# Patient Record
Sex: MALE | Race: BLACK OR AFRICAN AMERICAN | NOT HISPANIC OR LATINO | Employment: FULL TIME | URBAN - METROPOLITAN AREA
[De-identification: names, ages, dates, MRNs, and addresses within clinical notes are randomized per-mention and may not be internally consistent; named-entity substitution may affect disease eponyms.]

---

## 2018-11-29 RX ORDER — LISINOPRIL 20 MG/1
TABLET ORAL
COMMUNITY
Start: 2018-04-10 | End: 2018-11-30 | Stop reason: SDUPTHER

## 2018-11-29 RX ORDER — ASPIRIN 81 MG/1
TABLET ORAL EVERY 24 HOURS
COMMUNITY
Start: 2016-01-20 | End: 2018-12-28

## 2018-11-30 ENCOUNTER — OFFICE VISIT (OUTPATIENT)
Dept: FAMILY MEDICINE CLINIC | Facility: CLINIC | Age: 53
End: 2018-11-30
Payer: COMMERCIAL

## 2018-11-30 VITALS
SYSTOLIC BLOOD PRESSURE: 152 MMHG | RESPIRATION RATE: 18 BRPM | HEART RATE: 64 BPM | DIASTOLIC BLOOD PRESSURE: 82 MMHG | TEMPERATURE: 98.3 F | OXYGEN SATURATION: 97 % | WEIGHT: 261.1 LBS | HEIGHT: 67 IN | BODY MASS INDEX: 40.98 KG/M2

## 2018-11-30 DIAGNOSIS — I10 ESSENTIAL HYPERTENSION: Primary | ICD-10-CM

## 2018-11-30 DIAGNOSIS — J18.9 PNEUMONIA OF LEFT LOWER LOBE DUE TO INFECTIOUS ORGANISM: ICD-10-CM

## 2018-11-30 DIAGNOSIS — R73.9 HYPERGLYCEMIA: ICD-10-CM

## 2018-11-30 DIAGNOSIS — H55.01 CONGENITAL NYSTAGMUS: ICD-10-CM

## 2018-11-30 PROCEDURE — 1036F TOBACCO NON-USER: CPT | Performed by: FAMILY MEDICINE

## 2018-11-30 PROCEDURE — 3008F BODY MASS INDEX DOCD: CPT | Performed by: FAMILY MEDICINE

## 2018-11-30 PROCEDURE — 99214 OFFICE O/P EST MOD 30 MIN: CPT | Performed by: FAMILY MEDICINE

## 2018-11-30 RX ORDER — LISINOPRIL 20 MG/1
20 TABLET ORAL DAILY
Qty: 90 TABLET | Refills: 3 | Status: SHIPPED | OUTPATIENT
Start: 2018-11-30 | End: 2020-03-08

## 2018-11-30 NOTE — LETTER
Eugenio Herring  1965      To whom it may concern:       Patient has DIANA and uses CPAP    Renew supplies as required                              FRIEDA Haider

## 2018-11-30 NOTE — PATIENT INSTRUCTIONS
Use your inhaler every 4 hours while awake for at least 4-5 days regularly  Finish the antibiotic  Okay to return to work Monday if you feel up to it  Return in 1 month to review this issue and your chronic medical problems  I will hopefully have the reports from [facility] so we can review  Likely will need a repeat x-ray to document complete clearing of the pneumonia

## 2018-11-30 NOTE — PROGRESS NOTES
Assessment/Plan:    No problem-specific Assessment & Plan notes found for this encounter  Diagnoses and all orders for this visit:    Essential hypertension  -     lisinopril (ZESTRIL) 20 mg tablet; Take 1 tablet (20 mg total) by mouth daily    Congenital nystagmus    Hyperglycemia    Pneumonia of left lower lobe due to infectious organism [facility])          Subjective:      Patient ID: Rosa Smith is a 52 y o  male  Trials comes today to follow-up of but recent bout of pneumonia  He was seen in the emergency room yesterday and was given Levaquin by intravenous  He is to start pills today for 1 week  He does notice a little bit of worsening asthma during the last several days as well and did have some fever yesterday  No tobacco          The following portions of the patient's history were reviewed and updated as appropriate: allergies, current medications, past family history, past medical history, past social history, past surgical history and problem list     Review of Systems   Constitutional: Negative for activity change and appetite change  HENT: Negative for trouble swallowing  Eyes: Negative for visual disturbance  Respiratory: Positive for cough and wheezing  Negative for shortness of breath  Cardiovascular: Negative for chest pain, palpitations and leg swelling  Gastrointestinal: Negative for abdominal pain and blood in stool  Endocrine: Negative for polyuria  Genitourinary: Negative for difficulty urinating and hematuria  Skin: Negative for rash  Neurological: Negative for dizziness  Psychiatric/Behavioral: Negative for behavioral problems  Objective:  Vitals:    11/30/18 1319   BP: 152/82   BP Location: Left arm   Patient Position: Sitting   Cuff Size: Large   Pulse: 64   Resp: 18   Temp: 98 3 °F (36 8 °C)   SpO2: 97%   Weight: 118 kg (261 lb 1 6 oz)   Height: 5' 7" (1 702 m)      Physical Exam   Constitutional: He appears well-developed and well-nourished  HENT:   Head: Normocephalic and atraumatic  Eyes: Conjunctivae are normal    Neck: Neck supple  No thyromegaly present  Cardiovascular: Normal rate, regular rhythm, normal heart sounds and intact distal pulses  No murmur heard  Pulmonary/Chest: Effort normal  He has wheezes  He has rales  LLL pneumonia findings    Musculoskeletal: He exhibits no edema  Lymphadenopathy:     He has no cervical adenopathy  Skin: Skin is warm and dry  Psychiatric: He has a normal mood and affect   His behavior is normal

## 2018-12-05 ENCOUNTER — TELEPHONE (OUTPATIENT)
Dept: FAMILY MEDICINE CLINIC | Facility: CLINIC | Age: 53
End: 2018-12-05

## 2018-12-05 NOTE — TELEPHONE ENCOUNTER
Pt stopped by the office stating he was seen by Dr Jennie Richardson for pneumonia and was given a work note  However his work place would like a form filled out  Pt would like if it could be faxed to the number on the form and would like to  the original copy when finished  Form will be put in Dr Jennie Richardson bin

## 2018-12-06 ENCOUNTER — TELEPHONE (OUTPATIENT)
Dept: FAMILY MEDICINE CLINIC | Facility: CLINIC | Age: 53
End: 2018-12-06

## 2018-12-06 NOTE — TELEPHONE ENCOUNTER
Called patient to inform him that his appointment on 12/27/18 at 3 pm with dr Madalyn Dodge has to be changed he will not be here in the afternoon this day so appointment was cancelled and rescheduled to 12/28/18 at 330 pm with dr Madalyn Dodge patient wanted late appointment

## 2018-12-28 ENCOUNTER — TELEPHONE (OUTPATIENT)
Dept: FAMILY MEDICINE CLINIC | Facility: CLINIC | Age: 53
End: 2018-12-28

## 2018-12-28 ENCOUNTER — OFFICE VISIT (OUTPATIENT)
Dept: FAMILY MEDICINE CLINIC | Facility: CLINIC | Age: 53
End: 2018-12-28
Payer: COMMERCIAL

## 2018-12-28 VITALS
SYSTOLIC BLOOD PRESSURE: 140 MMHG | OXYGEN SATURATION: 96 % | HEIGHT: 67 IN | WEIGHT: 270.5 LBS | BODY MASS INDEX: 42.46 KG/M2 | TEMPERATURE: 97.5 F | DIASTOLIC BLOOD PRESSURE: 80 MMHG | HEART RATE: 57 BPM

## 2018-12-28 DIAGNOSIS — I10 ESSENTIAL HYPERTENSION: ICD-10-CM

## 2018-12-28 DIAGNOSIS — J18.9 PNEUMONIA OF LEFT LOWER LOBE DUE TO INFECTIOUS ORGANISM: ICD-10-CM

## 2018-12-28 DIAGNOSIS — R73.9 HYPERGLYCEMIA: ICD-10-CM

## 2018-12-28 DIAGNOSIS — J45.909 UNCOMPLICATED ASTHMA, UNSPECIFIED ASTHMA SEVERITY, UNSPECIFIED WHETHER PERSISTENT: Primary | ICD-10-CM

## 2018-12-28 LAB
ANION GAP SERPL CALCULATED.3IONS-SCNC: 5 MMOL/L (ref 4–13)
BUN SERPL-MCNC: 13 MG/DL (ref 5–25)
CALCIUM SERPL-MCNC: 8.7 MG/DL (ref 8.3–10.1)
CHLORIDE SERPL-SCNC: 105 MMOL/L (ref 100–108)
CO2 SERPL-SCNC: 27 MMOL/L (ref 21–32)
CREAT SERPL-MCNC: 0.97 MG/DL (ref 0.6–1.3)
EST. AVERAGE GLUCOSE BLD GHB EST-MCNC: 146 MG/DL
GFR SERPL CREATININE-BSD FRML MDRD: 101 ML/MIN/1.73SQ M
GLUCOSE SERPL-MCNC: 97 MG/DL (ref 65–140)
HBA1C MFR BLD: 6.7 % (ref 4.2–6.3)
POTASSIUM SERPL-SCNC: 3.9 MMOL/L (ref 3.5–5.3)
SODIUM SERPL-SCNC: 137 MMOL/L (ref 136–145)

## 2018-12-28 PROCEDURE — 83036 HEMOGLOBIN GLYCOSYLATED A1C: CPT | Performed by: FAMILY MEDICINE

## 2018-12-28 PROCEDURE — 99214 OFFICE O/P EST MOD 30 MIN: CPT | Performed by: FAMILY MEDICINE

## 2018-12-28 PROCEDURE — 36415 COLL VENOUS BLD VENIPUNCTURE: CPT | Performed by: FAMILY MEDICINE

## 2018-12-28 PROCEDURE — 80048 BASIC METABOLIC PNL TOTAL CA: CPT | Performed by: FAMILY MEDICINE

## 2018-12-28 NOTE — PATIENT INSTRUCTIONS
Please consider doing a colonoscopy next year  When you are ready call me and I can schedule it we do not need to get together to do that

## 2018-12-28 NOTE — TELEPHONE ENCOUNTER
Per Pat Post   Called pt employer to confirm the correct return of date to work which is 12/03/18  I spoke to [employer] phone number [phone]  Form was also faxed to [phone] confirmation received

## 2018-12-28 NOTE — PROGRESS NOTES
Assessment/Plan:    Pneumonia of left lower lobe due to infectious organism ([address] )  Cleared ; lungs normal today        Diagnoses and all orders for this visit:    Uncomplicated asthma, unspecified asthma severity, unspecified whether persistent  -     fluticasone-salmeterol (ADVAIR HFA) 230-21 MCG/ACT inhaler; Inhale 2 puffs 2 (two) times a day Rinse mouth after use  Pneumonia of left lower lobe due to infectious organism [facility])    Essential hypertension    Hyperglycemia          Subjective:      Patient ID: Shital Doran is a 52 y o  male  PATIENT RETURNS FOR FOLLOW-UP OF CHRONIC MEDICAL CONDITIONS  NO HOSPITAL STAYS OR EMERGENCY VISITS RECENTLY  MEDS WERE REVIEWED AND NO SIDE EFFECTS  NO NEW ISSUES  UNLESS NOTED BELOW  NO NEW MEDICAL PROVIDER REPORTED  The following portions of the patient's history were reviewed and updated as appropriate: allergies, current medications, past family history, past medical history, past social history, past surgical history and problem list     Review of Systems   Constitutional: Negative for activity change and appetite change  HENT: Negative for trouble swallowing  Eyes: Negative for visual disturbance  Respiratory: Negative for cough and shortness of breath  Cardiovascular: Negative for chest pain, palpitations and leg swelling  Gastrointestinal: Negative for abdominal pain and blood in stool  Endocrine: Negative for polyuria  Genitourinary: Negative for difficulty urinating and hematuria  Skin: Negative for rash  Neurological: Negative for dizziness  Psychiatric/Behavioral: Negative for behavioral problems           Objective:  Vitals:    12/28/18 1526   BP: 140/80   BP Location: Left arm   Patient Position: Sitting   Cuff Size: Large   Pulse: 57   Temp: 97 5 °F (36 4 °C)   TempSrc: Temporal   SpO2: 96%   Weight: 123 kg (270 lb 8 oz)   Height: 5' 7" (1 702 m)      Physical Exam   Constitutional: He appears well-developed and well-nourished  HENT:   Head: Normocephalic and atraumatic  Eyes: Conjunctivae are normal    Neck: Neck supple  No thyromegaly present  Cardiovascular: Normal rate, regular rhythm, normal heart sounds and intact distal pulses  No murmur heard  Pulmonary/Chest: Effort normal and breath sounds normal  No respiratory distress  Musculoskeletal: He exhibits no edema  Lymphadenopathy:     He has no cervical adenopathy  Skin: Skin is warm and dry  Psychiatric: He has a normal mood and affect  His behavior is normal          Patient's chronic problems that were reviewed today are stable  Meds reviewed and no changes made  Appropriate labs and imaging were ordered  Preventive measures appropriate for age and sex were reviewed with patient  Immunizations were updated as appropriate  Refusing immunizations

## 2019-03-29 ENCOUNTER — TELEPHONE (OUTPATIENT)
Dept: FAMILY MEDICINE CLINIC | Facility: CLINIC | Age: 54
End: 2019-03-29

## 2019-04-01 DIAGNOSIS — M25.529 ELBOW PAIN, UNSPECIFIED LATERALITY: Primary | ICD-10-CM

## 2019-04-01 DIAGNOSIS — M25.519 SHOULDER PAIN, UNSPECIFIED CHRONICITY, UNSPECIFIED LATERALITY: ICD-10-CM

## 2019-06-21 ENCOUNTER — OFFICE VISIT (OUTPATIENT)
Dept: FAMILY MEDICINE CLINIC | Facility: CLINIC | Age: 54
End: 2019-06-21
Payer: COMMERCIAL

## 2019-06-21 VITALS
WEIGHT: 261 LBS | SYSTOLIC BLOOD PRESSURE: 138 MMHG | DIASTOLIC BLOOD PRESSURE: 86 MMHG | RESPIRATION RATE: 18 BRPM | BODY MASS INDEX: 39.56 KG/M2 | HEART RATE: 80 BPM | HEIGHT: 68 IN

## 2019-06-21 DIAGNOSIS — N52.8 OTHER MALE ERECTILE DYSFUNCTION: Chronic | ICD-10-CM

## 2019-06-21 DIAGNOSIS — R73.9 HYPERGLYCEMIA: Primary | ICD-10-CM

## 2019-06-21 DIAGNOSIS — G47.30 SLEEP APNEA, UNSPECIFIED TYPE: ICD-10-CM

## 2019-06-21 DIAGNOSIS — I10 ESSENTIAL HYPERTENSION: ICD-10-CM

## 2019-06-21 LAB
CREAT UR-MCNC: 198 MG/DL
MICROALBUMIN UR-MCNC: 14.6 MG/L (ref 0–20)
MICROALBUMIN/CREAT 24H UR: 7 MG/G CREATININE (ref 0–30)
SL AMB POCT HGB: 5.9

## 2019-06-21 PROCEDURE — 99214 OFFICE O/P EST MOD 30 MIN: CPT | Performed by: FAMILY MEDICINE

## 2019-06-21 PROCEDURE — 3075F SYST BP GE 130 - 139MM HG: CPT | Performed by: FAMILY MEDICINE

## 2019-06-21 PROCEDURE — 3008F BODY MASS INDEX DOCD: CPT | Performed by: FAMILY MEDICINE

## 2019-06-21 PROCEDURE — 82570 ASSAY OF URINE CREATININE: CPT | Performed by: FAMILY MEDICINE

## 2019-06-21 PROCEDURE — 3079F DIAST BP 80-89 MM HG: CPT | Performed by: FAMILY MEDICINE

## 2019-06-21 PROCEDURE — 85018 HEMOGLOBIN: CPT | Performed by: FAMILY MEDICINE

## 2019-06-21 PROCEDURE — 1036F TOBACCO NON-USER: CPT | Performed by: FAMILY MEDICINE

## 2019-06-21 PROCEDURE — 36416 COLLJ CAPILLARY BLOOD SPEC: CPT | Performed by: FAMILY MEDICINE

## 2019-06-21 PROCEDURE — 82043 UR ALBUMIN QUANTITATIVE: CPT | Performed by: FAMILY MEDICINE

## 2019-06-21 RX ORDER — TADALAFIL 20 MG/1
20 TABLET ORAL DAILY PRN
Qty: 10 TABLET | Refills: 12 | Status: SHIPPED | OUTPATIENT
Start: 2019-06-21 | End: 2020-04-24 | Stop reason: ALTCHOICE

## 2019-07-23 ENCOUNTER — TELEPHONE (OUTPATIENT)
Dept: FAMILY MEDICINE CLINIC | Facility: CLINIC | Age: 54
End: 2019-07-23

## 2019-07-23 NOTE — TELEPHONE ENCOUNTER
Left message for patient to call back  Patient has an upcoming appt with glp  glp is away on vacation  Patient needs to reschedule

## 2019-08-22 ENCOUNTER — OFFICE VISIT (OUTPATIENT)
Dept: FAMILY MEDICINE CLINIC | Facility: CLINIC | Age: 54
End: 2019-08-22
Payer: COMMERCIAL

## 2019-08-22 VITALS
SYSTOLIC BLOOD PRESSURE: 140 MMHG | HEIGHT: 68 IN | TEMPERATURE: 97.8 F | OXYGEN SATURATION: 96 % | WEIGHT: 260.6 LBS | HEART RATE: 60 BPM | DIASTOLIC BLOOD PRESSURE: 70 MMHG | BODY MASS INDEX: 39.5 KG/M2

## 2019-08-22 DIAGNOSIS — L91.8 SKIN TAGS, MULTIPLE ACQUIRED: ICD-10-CM

## 2019-08-22 DIAGNOSIS — Z12.11 ENCOUNTER FOR SCREENING COLONOSCOPY: Primary | ICD-10-CM

## 2019-08-22 PROCEDURE — 17110 DESTRUCTION B9 LES UP TO 14: CPT | Performed by: FAMILY MEDICINE

## 2019-08-22 NOTE — PROGRESS NOTES
Assessment/Plan:    No problem-specific Assessment & Plan notes found for this encounter  Diagnoses and all orders for this visit:    Encounter for screening colonoscopy  -     Ambulatory referral to Gastroenterology; Future    Skin tags, multiple acquired          Subjective:      Patient ID: Nina Watkins is a 52 y o  male      Skin tags       The following portions of the patient's history were reviewed and updated as appropriate: allergies, current medications, past family history, past medical history, past social history, past surgical history and problem list     Review of Systems      Objective:  Vitals:    08/22/19 1458   BP: 140/70   BP Location: Left arm   Patient Position: Sitting   Cuff Size: Large   Pulse: 60   Temp: 97 8 °F (36 6 °C)   TempSrc: Temporal   SpO2: 96%   Weight: 118 kg (260 lb 9 6 oz)   Height: 5' 8" (1 727 m)      Physical Exam   Skin:   multipple tags neck      frozen

## 2019-09-18 ENCOUNTER — OFFICE VISIT (OUTPATIENT)
Dept: FAMILY MEDICINE CLINIC | Facility: CLINIC | Age: 54
End: 2019-09-18
Payer: COMMERCIAL

## 2019-09-18 VITALS
WEIGHT: 258.6 LBS | SYSTOLIC BLOOD PRESSURE: 152 MMHG | BODY MASS INDEX: 39.19 KG/M2 | TEMPERATURE: 98 F | RESPIRATION RATE: 18 BRPM | DIASTOLIC BLOOD PRESSURE: 86 MMHG | OXYGEN SATURATION: 97 % | HEIGHT: 68 IN | HEART RATE: 56 BPM

## 2019-09-18 DIAGNOSIS — Z01.818 PREPROCEDURAL GENERAL PHYSICAL EXAMINATION: Primary | ICD-10-CM

## 2019-09-18 DIAGNOSIS — H33.22 DETACHED RETINA, LEFT: ICD-10-CM

## 2019-09-18 DIAGNOSIS — Z28.21 REFUSED INFLUENZA VACCINE: ICD-10-CM

## 2019-09-18 LAB
BASOPHILS # BLD AUTO: 0.03 THOUSANDS/ΜL (ref 0–0.1)
BASOPHILS NFR BLD AUTO: 1 % (ref 0–1)
EOSINOPHIL # BLD AUTO: 0.11 THOUSAND/ΜL (ref 0–0.61)
EOSINOPHIL NFR BLD AUTO: 3 % (ref 0–6)
ERYTHROCYTE [DISTWIDTH] IN BLOOD BY AUTOMATED COUNT: 13.6 % (ref 11.6–15.1)
HCT VFR BLD AUTO: 44.7 % (ref 36.5–49.3)
HGB BLD-MCNC: 14.6 G/DL (ref 12–17)
IMM GRANULOCYTES # BLD AUTO: 0.01 THOUSAND/UL (ref 0–0.2)
IMM GRANULOCYTES NFR BLD AUTO: 0 % (ref 0–2)
LYMPHOCYTES # BLD AUTO: 1.48 THOUSANDS/ΜL (ref 0.6–4.47)
LYMPHOCYTES NFR BLD AUTO: 39 % (ref 14–44)
MCH RBC QN AUTO: 29.3 PG (ref 26.8–34.3)
MCHC RBC AUTO-ENTMCNC: 32.7 G/DL (ref 31.4–37.4)
MCV RBC AUTO: 90 FL (ref 82–98)
MONOCYTES # BLD AUTO: 0.48 THOUSAND/ΜL (ref 0.17–1.22)
MONOCYTES NFR BLD AUTO: 13 % (ref 4–12)
NEUTROPHILS # BLD AUTO: 1.66 THOUSANDS/ΜL (ref 1.85–7.62)
NEUTS SEG NFR BLD AUTO: 44 % (ref 43–75)
NRBC BLD AUTO-RTO: 0 /100 WBCS
PLATELET # BLD AUTO: 246 THOUSANDS/UL (ref 149–390)
PMV BLD AUTO: 9.6 FL (ref 8.9–12.7)
RBC # BLD AUTO: 4.99 MILLION/UL (ref 3.88–5.62)
WBC # BLD AUTO: 3.77 THOUSAND/UL (ref 4.31–10.16)

## 2019-09-18 PROCEDURE — 85025 COMPLETE CBC W/AUTO DIFF WBC: CPT | Performed by: NURSE PRACTITIONER

## 2019-09-18 PROCEDURE — 80053 COMPREHEN METABOLIC PANEL: CPT | Performed by: NURSE PRACTITIONER

## 2019-09-18 PROCEDURE — 99214 OFFICE O/P EST MOD 30 MIN: CPT | Performed by: NURSE PRACTITIONER

## 2019-09-18 PROCEDURE — 36415 COLL VENOUS BLD VENIPUNCTURE: CPT | Performed by: NURSE PRACTITIONER

## 2019-09-18 PROCEDURE — 93000 ELECTROCARDIOGRAM COMPLETE: CPT | Performed by: NURSE PRACTITIONER

## 2019-09-18 PROCEDURE — 3008F BODY MASS INDEX DOCD: CPT | Performed by: NURSE PRACTITIONER

## 2019-09-18 NOTE — PROGRESS NOTES
[facility] PRE-OPERATIVE EVALUATION  [facility] - [facility]    NAME: Donaldo Chowdhury  AGE: 52 y o  SEX: male  : 1965     DATE: 2019    Family Practice Pre-Operative Evaluation      Chief Complaint: Pre-operative Evaluation     Surgery:  Left eye vitrectomy  Anticipated Date of Surgery:  2019  Referring Provider: No ref  provider found       History of Present Illness:     Donaldo Chowdhury is a 52 y o  male who presents to the office today for a preoperative consultation at the request of Amanda Wang MD, who plans on performing left eye vitrectomy on 2019  Planned anesthesia is general  Patient has a bleeding risk of: no recent abnormal bleeding, no remote history of abnormal bleeding and no use of Ca-channel blockers  Patient does not have objections to receiving blood products if needed  Current anti-platelet/anti-coagulation medications that the patient is prescribed includes: none  Assessment of Chronic Conditions:   - Hypertension: essential  - Hyperglycemia     Assessment of Cardiac Risk:  · Denies unstable or severe angina or MI in the last 6 weeks or history of stent placement in the last year   · Denies decompensated heart failure (e g  New onset heart failure, NYHA functional class IV heart failure, or worsening existing heart failure)  · Denies significant arrhythmias such as high grade AV block, symptomatic ventricular arrhythmia, newly recognized ventricular tachycardia, supraventricular tachycardia with resting heart rate >100, or symptomatic bradycardia  · Denies severe heart valve disease including aortic stenosis or symptomatic mitral stenosis     Exercise Capacity:  · Able to walk 4 blocks without symptoms?: Yes  · Able to walk 2 flights without symptoms?: Yes    Prior Anesthesia Reactions: Unknown     Personal history of venous thromboembolic disease?  No    History of steroid use for >2 weeks within last year? No         Review of Systems:     Review of Systems   Constitutional: Negative for chills, fatigue and fever  HENT: Negative for congestion, ear pain, postnasal drip, rhinorrhea, sinus pressure and sore throat  Eyes: Positive for pain and visual disturbance  Respiratory: Negative for cough, shortness of breath and wheezing  Cardiovascular: Negative for chest pain, palpitations and leg swelling  Gastrointestinal: Negative for abdominal pain, blood in stool, constipation, diarrhea, nausea and vomiting  Endocrine: Negative for cold intolerance, heat intolerance, polydipsia, polyphagia and polyuria  Genitourinary: Negative for difficulty urinating, discharge, dysuria, flank pain, frequency, scrotal swelling, testicular pain and urgency  Musculoskeletal: Negative for arthralgias, back pain and gait problem  Skin: Negative for rash  Allergic/Immunologic: Negative for environmental allergies and food allergies  Neurological: Negative for dizziness and headaches  Hematological: Does not bruise/bleed easily  Psychiatric/Behavioral: Negative for dysphoric mood  The patient is not nervous/anxious  Current Problem List:     Patient Active Problem List   Diagnosis    Cervical spinal stenosis    Congenital nystagmus    Hypertension    Sleep apnea    Hyperglycemia    Pneumonia of left lower lobe due to infectious organism [facility])    Other male erectile dysfunction    Skin tags, multiple acquired       Allergies:      Allergies   Allergen Reactions    No Active Allergies        Current Medications:       Current Outpatient Medications:     lisinopril (ZESTRIL) 20 mg tablet, Take 1 tablet (20 mg total) by mouth daily, Disp: 90 tablet, Rfl: 3    albuterol (PROVENTIL HFA,VENTOLIN HFA) 90 mcg/act inhaler, inhale 2 puff by inhalation route  every 4 - 6 hours as needed, Disp: , Rfl:     fluticasone-salmeterol (ADVAIR HFA) 230-21 MCG/ACT inhaler, Inhale 2 puffs 2 (two) times a day Rinse mouth after use  (Patient not taking: Reported on 9/18/2019), Disp: 3 Inhaler, Rfl: 3    tadalafil (CIALIS) 20 MG tablet, Take 1 tablet (20 mg total) by mouth daily as needed for erectile dysfunction (Patient not taking: Reported on 9/18/2019), Disp: 10 tablet, Rfl: 12    Past Medical History:       Past Medical History:   Diagnosis Date    Asthma     hospitalization    Hypertension 2/1/2006    Onychomycosis of toenail     Pneumonia of left lower lobe due to infectious organism ([facility] [address] ) 11/30/2018        History reviewed  No pertinent surgical history       Family History   Problem Relation Age of Onset    Hypertension Family         Social History     Socioeconomic History    Marital status: /Civil Union     Spouse name: Not on file    Number of children: Not on file    Years of education: Not on file    Highest education level: Not on file   Occupational History    Not on file   Social Needs    Financial resource strain: Not on file    Food insecurity:     Worry: Not on file     Inability: Not on file    Transportation needs:     Medical: Not on file     Non-medical: Not on file   Tobacco Use    Smoking status: Never Smoker    Smokeless tobacco: Never Used   Substance and Sexual Activity    Alcohol use: Yes     Frequency: Monthly or less     Comment: social    Drug use: No    Sexual activity: Not on file   Lifestyle    Physical activity:     Days per week: Not on file     Minutes per session: Not on file    Stress: Not on file   Relationships    Social connections:     Talks on phone: Not on file     Gets together: Not on file     Attends [employer]ism service: Not on file     Active member of club or organization: Not on file     Attends meetings of clubs or organizations: Not on file     Relationship status: Not on file    Intimate partner violence:     Fear of current or ex partner: Not on file     Emotionally abused: Not on file     Physically abused: Not on file     Forced sexual activity: Not on file   Other Topics Concern    Not on file   Social History Narrative    Not on file        Physical Exam:     /86 (BP Location: Left arm, Patient Position: Sitting, Cuff Size: Large)   Pulse 56   Temp 98 °F (36 7 °C) (Temporal)   Resp 18   Ht 5' 8" (1 727 m)   Wt 117 kg (258 lb 9 6 oz)   SpO2 97%   BMI 39 32 kg/m²     Physical Exam   Constitutional: He is oriented to person, place, and time  He appears well-developed and well-nourished  HENT:   Head: Normocephalic  Right Ear: Tympanic membrane, external ear and ear canal normal    Left Ear: Tympanic membrane, external ear and ear canal normal    Nose: Nose normal    Eyes: Pupils are equal, round, and reactive to light  Conjunctivae and EOM are normal    Neck: Normal range of motion  Neck supple  No thyromegaly present  Cardiovascular: Normal rate, regular rhythm and normal heart sounds  Pulmonary/Chest: Effort normal and breath sounds normal    Abdominal: Soft  Bowel sounds are normal  He exhibits no distension and no mass  There is no tenderness  There is no rebound and no guarding  Musculoskeletal: Normal range of motion  Lymphadenopathy:     He has no cervical adenopathy  Neurological: He is alert and oriented to person, place, and time  He has normal reflexes  Skin: Skin is warm and dry  Psychiatric: He has a normal mood and affect  His behavior is normal         Data:     Pre-operative work-up    Laboratory Results: Labs ordered     EKG: ordered    Chest x-ray: N/A      Previous cardiopulmonary studies within the past year:  · Echocardiogram: No  · Cardiac Catheterization: No  · Stress Test: No  · Pulmonary Function Testing: No      Assessment & Recommendations:     1  Preprocedural general physical examination  CBC and differential    Comprehensive metabolic panel    POCT ECG   2  Detached retina, left     3   Refused influenza vaccine         Pre-Op Evaluation Assessment  52 y o  male with planned surgery:  Left eye vitrectomy  Known risk factors for perioperative complications: None  Current medications which may produce withdrawal symptoms if withheld perioperatively: none  Pre-Op Evaluation Plan  1  Further preoperative workup as follows:   - ECG  - Complete blood count  - Basic metabolic profile    2  Medication Management/Recommendations:   - None, continue medication regimen including morning of surgery, with sip of water    3  Prophylaxis for cardiac events with perioperative beta-blockers: not indicated  4  Patient requires further consultation with: None    Clearance  Patient is CLEARED for surgery without any additional cardiac testing       ANNY Mares[place]άλη [place] [address] [place] [id number]  Phone#  [phone]  Fax#  [phone]

## 2019-09-19 LAB
ALBUMIN SERPL BCP-MCNC: 4.6 G/DL (ref 3.5–5)
ALP SERPL-CCNC: 68 U/L (ref 46–116)
ALT SERPL W P-5'-P-CCNC: 34 U/L (ref 12–78)
ANION GAP SERPL CALCULATED.3IONS-SCNC: 6 MMOL/L (ref 4–13)
AST SERPL W P-5'-P-CCNC: 23 U/L (ref 5–45)
BILIRUB SERPL-MCNC: 0.75 MG/DL (ref 0.2–1)
BUN SERPL-MCNC: 12 MG/DL (ref 5–25)
CALCIUM SERPL-MCNC: 9 MG/DL (ref 8.3–10.1)
CHLORIDE SERPL-SCNC: 109 MMOL/L (ref 100–108)
CO2 SERPL-SCNC: 26 MMOL/L (ref 21–32)
CREAT SERPL-MCNC: 1.04 MG/DL (ref 0.6–1.3)
GFR SERPL CREATININE-BSD FRML MDRD: 93 ML/MIN/1.73SQ M
GLUCOSE SERPL-MCNC: 95 MG/DL (ref 65–140)
POTASSIUM SERPL-SCNC: 4 MMOL/L (ref 3.5–5.3)
PROT SERPL-MCNC: 7.6 G/DL (ref 6.4–8.2)
SODIUM SERPL-SCNC: 141 MMOL/L (ref 136–145)

## 2020-01-23 ENCOUNTER — OFFICE VISIT (OUTPATIENT)
Dept: FAMILY MEDICINE CLINIC | Facility: CLINIC | Age: 55
End: 2020-01-23
Payer: COMMERCIAL

## 2020-01-23 VITALS
HEIGHT: 68 IN | SYSTOLIC BLOOD PRESSURE: 160 MMHG | BODY MASS INDEX: 41.37 KG/M2 | OXYGEN SATURATION: 96 % | WEIGHT: 273 LBS | HEART RATE: 52 BPM | DIASTOLIC BLOOD PRESSURE: 90 MMHG

## 2020-01-23 DIAGNOSIS — R73.9 HYPERGLYCEMIA: ICD-10-CM

## 2020-01-23 DIAGNOSIS — H55.01 CONGENITAL NYSTAGMUS: ICD-10-CM

## 2020-01-23 DIAGNOSIS — G47.30 SLEEP APNEA, UNSPECIFIED TYPE: ICD-10-CM

## 2020-01-23 DIAGNOSIS — Z12.11 ENCOUNTER FOR SCREENING COLONOSCOPY: Primary | ICD-10-CM

## 2020-01-23 DIAGNOSIS — Z11.59 SCREENING FOR VIRAL DISEASE: ICD-10-CM

## 2020-01-23 DIAGNOSIS — I10 ESSENTIAL HYPERTENSION: ICD-10-CM

## 2020-01-23 DIAGNOSIS — Z23 IMMUNIZATION DUE: ICD-10-CM

## 2020-01-23 DIAGNOSIS — E66.01 MORBID OBESITY WITH BMI OF 40.0-44.9, ADULT (HCC): ICD-10-CM

## 2020-01-23 LAB
ANION GAP SERPL CALCULATED.3IONS-SCNC: 4 MMOL/L (ref 4–13)
BUN SERPL-MCNC: 10 MG/DL (ref 5–25)
CALCIUM SERPL-MCNC: 9.4 MG/DL (ref 8.3–10.1)
CHLORIDE SERPL-SCNC: 109 MMOL/L (ref 100–108)
CO2 SERPL-SCNC: 26 MMOL/L (ref 21–32)
CREAT SERPL-MCNC: 1.12 MG/DL (ref 0.6–1.3)
GFR SERPL CREATININE-BSD FRML MDRD: 84 ML/MIN/1.73SQ M
GLUCOSE SERPL-MCNC: 101 MG/DL (ref 65–140)
POTASSIUM SERPL-SCNC: 3.9 MMOL/L (ref 3.5–5.3)
SODIUM SERPL-SCNC: 139 MMOL/L (ref 136–145)

## 2020-01-23 PROCEDURE — 80048 BASIC METABOLIC PNL TOTAL CA: CPT | Performed by: FAMILY MEDICINE

## 2020-01-23 PROCEDURE — 87389 HIV-1 AG W/HIV-1&-2 AB AG IA: CPT | Performed by: FAMILY MEDICINE

## 2020-01-23 PROCEDURE — 36415 COLL VENOUS BLD VENIPUNCTURE: CPT | Performed by: FAMILY MEDICINE

## 2020-01-23 PROCEDURE — 1036F TOBACCO NON-USER: CPT | Performed by: FAMILY MEDICINE

## 2020-01-23 PROCEDURE — 99214 OFFICE O/P EST MOD 30 MIN: CPT | Performed by: FAMILY MEDICINE

## 2020-01-23 PROCEDURE — 3008F BODY MASS INDEX DOCD: CPT | Performed by: FAMILY MEDICINE

## 2020-01-23 PROCEDURE — 83036 HEMOGLOBIN GLYCOSYLATED A1C: CPT | Performed by: FAMILY MEDICINE

## 2020-01-23 PROCEDURE — 86803 HEPATITIS C AB TEST: CPT | Performed by: FAMILY MEDICINE

## 2020-01-23 RX ORDER — BROMFENAC 1.03 MG/ML
SOLUTION/ DROPS OPHTHALMIC
COMMUNITY
Start: 2020-01-14 | End: 2020-04-24 | Stop reason: ALTCHOICE

## 2020-01-23 RX ORDER — PREDNISOLONE ACETATE 10 MG/ML
SUSPENSION/ DROPS OPHTHALMIC
COMMUNITY
Start: 2020-01-14 | End: 2020-04-24 | Stop reason: ALTCHOICE

## 2020-01-23 RX ORDER — OFLOXACIN 3 MG/ML
SOLUTION/ DROPS OPHTHALMIC
COMMUNITY
Start: 2020-01-14 | End: 2020-04-24 | Stop reason: ALTCHOICE

## 2020-01-23 NOTE — PATIENT INSTRUCTIONS
Weight Management   AMBULATORY CARE:   Why it is important to manage your weight:  Being overweight increases your risk of health conditions such as heart disease, high blood pressure, type 2 diabetes, and certain types of cancer  It can also increase your risk for osteoarthritis, sleep apnea, and other respiratory problems  Aim for a slow, steady weight loss  Even a small amount of weight loss can lower your risk of health problems  How to lose weight safely:  A safe and healthy way to lose weight is to eat fewer calories and get regular exercise  You can lose up about 1 pound a week by decreasing the number of calories you eat by 500 calories each day  You can decrease calories by eating smaller portion sizes or by cutting out high-calorie foods  Read labels to find out how many calories are in the foods you eat  You can also burn calories with exercise such as walking, swimming, or biking  You will be more likely to keep weight off if you make these changes part of your lifestyle  Healthy meal plan for weight management:  A healthy meal plan includes a variety of foods, contains fewer calories, and helps you stay healthy  A healthy meal plan includes the following:  · Eat whole-grain foods more often  A healthy meal plan should contain fiber  Fiber is the part of grains, fruits, and vegetables that is not broken down by your body  Whole-grain foods are healthy and provide extra fiber in your diet  Some examples of whole-grain foods are whole-wheat breads and pastas, oatmeal, brown rice, and bulgur  · Eat a variety of vegetables every day  Include dark, leafy greens such as spinach, kale, khanh greens, and mustard greens  Eat yellow and orange vegetables such as carrots, sweet potatoes, and winter squash  · Eat a variety of fruits every day  Choose fresh or canned fruit (canned in its own juice or light syrup) instead of juice  Fruit juice has very little or no fiber  · Eat low-fat dairy foods  Drink fat-free (skim) milk or 1% milk  Eat fat-free yogurt and low-fat cottage cheese  Try low-fat cheeses such as mozzarella and other reduced-fat cheeses  · Choose meat and other protein foods that are low in fat  Choose beans or other legumes such as split peas or lentils  Choose fish, skinless poultry (chicken or turkey), or lean cuts of red meat (beef or pork)  Before you cook meat or poultry, cut off any visible fat  · Use less fat and oil  Try baking foods instead of frying them  Add less fat, such as margarine, sour cream, regular salad dressing and mayonnaise to foods  Eat fewer high-fat foods  Some examples of high-fat foods include french fries, doughnuts, ice cream, and cakes  · Eat fewer sweets  Limit foods and drinks that are high in sugar  This includes candy, cookies, regular soda, and sweetened drinks  Ways to decrease calories:   · Eat smaller portions  ¨ Use a small plate with smaller servings  ¨ Do not eat second helpings  ¨ When you eat at a restaurant, ask for a box and place half of your meal in the box before you eat  ¨ Share an entrée with someone else  · Replace high-calorie snacks with healthy, low-calorie snacks  ¨ Choose fresh fruit, vegetables, fat-free rice cakes, or air-popped popcorn instead of potato chips, nuts, or chocolate  ¨ Choose water or calorie-free drinks instead of soda or sweetened drinks  · Eat regular meals  Skipping meals can lead to overeating later in the day  Eat a healthy snack in place of a meal if you do not have time to eat a regular meal      · Do not shop for groceries when you are hungry  You may be more likely to make unhealthy food choices  Take a grocery list of healthy foods and shop after you have eaten  Exercise:  Exercise at least 30 minutes per day on most days of the week  Some examples of exercise include walking, biking, dancing, and swimming   You can also fit in more physical activity by taking the stairs instead of the elevator or parking farther away from stores  Ask your healthcare provider about the best exercise plan for you  Other things to consider as you try to lose weight:   · Be aware of situations that may give you the urge to overeat, such as eating while watching television  Find ways to avoid these situations  For example, read a book, go for a walk, or do crafts  · Meet with a weight loss support group or friends who are also trying to lose weight  This may help you stay motivated to continue working on your weight loss goals  © 2017 [address] [facility] Information is for End User's use only and may not be sold, redistributed or otherwise used for commercial purposes  All illustrations and images included in CareNotes® are the copyrighted property of [employer]  or Newton Donaldson  The above information is an  only  It is not intended as medical advice for individual conditions or treatments  Talk to your doctor, nurse or pharmacist before following any medical regimen to see if it is safe and effective for you  Heart Healthy Diet   AMBULATORY CARE:   A heart healthy diet  is an eating plan low in total fat, unhealthy fats, and sodium (salt)  A heart healthy diet helps decrease your risk for heart disease and stroke  Limit the amount of fat you eat to 25% to 35% of your total daily calories  Limit sodium to less than 2,300 mg each day  Healthy fats:  Healthy fats can help improve cholesterol levels  The risk for heart disease is decreased when cholesterol levels are normal  Choose healthy fats, such as the following:  · Unsaturated fat  is found in foods such as soybean, canola, olive, corn, and safflower oils  It is also found in soft tub margarine that is made with liquid vegetable oil  · Omega-3 fat  is found in certain fish, such as salmon, tuna, and trout, and in walnuts and flaxseed    Unhealthy fats:  Unhealthy fats can cause unhealthy cholesterol levels in your blood and increase your risk of heart disease  Limit unhealthy fats, such as the following:  · Cholesterol  is found in animal foods, such as eggs and lobster, and in dairy products made from whole milk  Limit cholesterol to less than 300 milligrams (mg) each day  You may need to limit cholesterol to 200 mg each day if you have heart disease  · Saturated fat  is found in meats, such as enamorado and hamburger  It is also found in chicken or turkey skin, whole milk, and butter  Limit saturated fat to less than 7% of your total daily calories  Limit saturated fat to less than 6% if you have heart disease or are at increased risk for it  · Trans fat  is found in packaged foods, such as potato chips and cookies  It is also in hard margarine, some fried foods, and shortening  Avoid trans fats as much as possible    Heart healthy foods and drinks to include:  Ask your dietitian or healthcare provider how many servings to have from each of the following food groups:  · Grains:      ¨ Whole-wheat breads, cereals, and pastas, and brown rice    ¨ Low-fat, low-sodium crackers and chips    · Vegetables:      ¨ Broccoli, green beans, green peas, and spinach    ¨ Collards, kale, and lima beans    ¨ Carrots, sweet potatoes, tomatoes, and peppers    ¨ Canned vegetables with no salt added    · Fruits:      ¨ Bananas, peaches, pears, and pineapple    ¨ Grapes, raisins, and dates    ¨ Oranges, tangerines, grapefruit, orange juice, and grapefruit juice    ¨ Apricots, mangoes, melons, and papaya    ¨ Raspberries and strawberries    ¨ Canned fruit with no added sugar    · Low-fat dairy products:      ¨ Nonfat (skim) milk, 1% milk, and low-fat almond, cashew, or soy milks fortified with calcium    ¨ Low-fat cheese, regular or frozen yogurt, and cottage cheese    · Meats and proteins , such as lean cuts of beef and pork (loin, leg, round), skinless chicken and turkey, legumes, soy products, egg whites, and nuts  Foods and drinks to limit or avoid:  Ask your dietitian or healthcare provider about these and other foods that are high in unhealthy fat, sodium, and sugar:  · Snack or packaged foods , such as frozen dinners, cookies, macaroni and cheese, and cereals with more than 300 mg of sodium per serving    · Canned or dry mixes  for cakes, soups, sauces, or gravies    · Vegetables with added sodium , such as instant potatoes, vegetables with added sauces, or regular canned vegetables    · Other foods high in sodium , such as ketchup, barbecue sauce, salad dressing, pickles, olives, soy sauce, and miso    · High-fat dairy foods  such as whole or 2% milk, cream cheese, or sour cream, and cheeses     · High-fat protein foods  such as high-fat cuts of beef (T-bone steaks, ribs), chicken or turkey with skin, and organ meats, such as liver    · Cured or smoked meats , such as hot dogs, enamorado, and sausage    · Unhealthy fats and oils , such as butter, stick margarine, shortening, and cooking oils such as coconut or palm oil    · Food and drinks high in sugar , such as soft drinks (soda), sports drinks, sweetened tea, candy, cake, cookies, pies, and doughnuts  Other diet guidelines to follow:   · Eat more foods containing omega-3 fats  Eat fish high in omega-3 fats at least 2 times a week  · Limit alcohol  Too much alcohol can damage your heart and raise your blood pressure  Women should limit alcohol to 1 drink a day  Men should limit alcohol to 2 drinks a day  A drink of alcohol is 12 ounces of beer, 5 ounces of wine, or 1½ ounces of liquor  · Choose low-sodium foods  High-sodium foods can lead to high blood pressure  Add little or no salt to food you prepare  Use herbs and spices in place of salt  · Eat more fiber  to help lower cholesterol levels  Eat at least 5 servings of fruits and vegetables each day  Eat 3 ounces of whole-grain foods each day  Legumes (beans) are also a good source of fiber    Lifestyle guidelines: · Do not smoke  Nicotine and other chemicals in cigarettes and cigars can cause lung and heart damage  Ask your healthcare provider for information if you currently smoke and need help to quit  E-cigarettes or smokeless tobacco still contain nicotine  Talk to your healthcare provider before you use these products  · Exercise regularly  to help you maintain a healthy weight and improve your blood pressure and cholesterol levels  Ask your healthcare provider about the best exercise plan for you  Do not start an exercise program without asking your healthcare provider  Follow up with your healthcare provider as directed:  Write down your questions so you remember to ask them during your visits  © 2017 [address] [employer] Information is for End User's use only and may not be sold, redistributed or otherwise used for commercial purposes  All illustrations and images included in CareNotes® are the copyrighted property of [employer]  or Newton Donaldson  The above information is an  only  It is not intended as medical advice for individual conditions or treatments  Talk to your doctor, nurse or pharmacist before following any medical regimen to see if it is safe and effective for you  Calorie Counting Diet   WHAT YOU NEED TO KNOW:   What is a calorie counting diet? It is a meal plan based on counting calories each day to reach a healthy body weight  You will need to eat fewer calories if you are trying to lose weight  Weight loss may decrease your risk for certain health problems or improve your health if you have health problems  Some of these health problems include heart disease, high blood pressure, and diabetes  What foods should I avoid? Your dietitian will tell you if you need to avoid certain foods based on your body weight and health condition  You may need to avoid high-fat foods if you are at risk for or have heart disease   You may need to eat fewer foods from the breads and starches food group if you have diabetes  How many calories are in foods? The following is a list of foods and drinks with the approximate number of calories in each  Check the food label to find the exact number of calories  A dietitian can tell you how many calories you should have from each food group each day    · Carbohydrate:      ¨ ½ of a 3-inch bagel, 1 slice of bread, or ½ of a hamburger bun or hot dog bun (80)    ¨ 1 (8-inch) flour tortilla or ½ cup of cooked rice (100)    ¨ 1 (6-inch) corn tortilla (80)    ¨ 1 (6-inch) pancake or 1 cup of bran flakes cereal (110)    ¨ ½ cup of cooked cereal (80)    ¨ ½ cup of cooked pasta (85)    ¨ 1 ounce of pretzels (100)    ¨ 3 cups of air-popped popcorn without butter or oil (80)    · Dairy:      ¨ 1 cup of skim or 1% milk (90)    ¨ 1 cup of 2% milk (120)    ¨ 1 cup of whole milk (160)    ¨ 1 cup of 2% chocolate milk (220)    ¨ 1 ounce of low-fat cheese with 3 grams of fat per ounce (70)    ¨ 1 ounce of cheddar cheese (114)    ¨ ½ cup of 1% fat cottage cheese (80)    ¨ 1 cup of plain or sugar-free, fat-free yogurt (90)    · Protein foods:      ¨ 3 ounces of fish (not breaded or fried) (95)    ¨ 3 ounces of breaded, fried fish (195)    ¨ ¾ cup of tuna canned in water (105)    ¨ 3 ounces of chicken breast without skin (105)    ¨ 1 fried chicken breast with skin (350)    ¨ ¼ cup of fat free egg substitute (40)    ¨ 1 large egg (75)    ¨ 3 ounces of lean beef or pork (165)    ¨ 3 ounces of fried pork chop or ham (185)    ¨ ½ cup of cooked dried beans, such as kidney, marques, lentils, or navy (115)    ¨ 3 ounces of bologna or lunch meat (225)    ¨ 2 links of breakfast sausage (140)    · Vegetables:      ¨ ½ cup of sliced mushrooms (10)    ¨ 1 cup of salad greens, such as lettuce, spinach, or teresa (15)    ¨ ½ cup of steamed asparagus (20)    ¨ ½ cup of cooked summer squash, zucchini squash, or green or wax beans (25)    ¨ 1 cup of broccoli or cauliflower florets, or 1 medium tomato (25)    ¨ 1 large raw carrot or ½ cup of cooked carrots (40)    ¨ ? of a medium cucumber or 1 stalk of celery (5)    ¨ 1 small baked potato (160)    ¨ 1 cup of breaded, fried vegetables (230)    · Fruit:      ¨ 1 (6-inch) banana (55)     ¨ ½ of a 4-inch grapefruit (55)    ¨ 15 grapes (60)    ¨ 1 medium orange or apple (70)    ¨ 1 large peach (65)    ¨ 1 cup of fresh pineapple chunks (75)    ¨ 1 cup of melon cubes (50)    ¨ 1¼ cups of whole strawberries (45)    ¨ ½ cup of fruit canned in juice (55)    ¨ ½ cup of fruit canned in heavy syrup (110)    ¨ ?  cup of raisins (130)    ¨ ½ cup of unsweetened fruit juice (60)    ¨ ½ cup of grape, cranberry, or prune juice (90)    · Fat:      ¨ 10 peanuts or 2 teaspoons of peanut butter (55)    ¨ 2 tablespoons of avocado or 1 tablespoon of regular salad dressing (45)    ¨ 2 slices of enamorado (90)    ¨ 1 teaspoon of oil, such as safflower, canola, corn, or olive oil (45)    ¨ 2 teaspoons of low-fat margarine, or 1 tablespoon of low-fat mayonnaise (50)    ¨ 1 teaspoon of regular margarine (40)    ¨ 1 tablespoon of regular mayonnaise (135)    ¨ 1 tablespoon of cream cheese or 2 tablespoons of low-fat cream cheese (45)    ¨ 2 tablespoons of vegetable shortening (215)    · Dessert and sweets:      ¨ 8 animal crackers or 5 vanilla wafers (80)    ¨ 1 frozen fruit juice bar (80)    ¨ ½ cup of ice milk or low-fat frozen yogurt (90)    ¨ ½ cup of sherbet or sorbet (125)    ¨ ½ cup of sugar-free pudding or custard (60)    ¨ ½ cup of ice cream (140)    ¨ ½ cup of pudding or custard (175)    ¨ 1 (2-inch) square chocolate brownie (185)    · Combination foods:      ¨ Bean burrito made with an 8-inch tortilla, without cheese (275)    ¨ Chicken breast sandwich with lettuce and tomato (325)    ¨ 1 cup of chicken noodle soup (60)    ¨ 1 beef taco (175)    ¨ Regular hamburger with lettuce and tomato (310)    ¨ Regular cheeseburger with lettuce and tomato (410)     ¨ ¼ of a 12-inch cheese pizza (280)    ¨ Fried fish sandwich with lettuce and tomato (425)    ¨ Hot dog and bun (275)    ¨ 1½ cups of macaroni and cheese (310)    ¨ Taco salad with a fried tortilla shell (870)    · Low-calorie foods:      ¨ 1 tablespoon of ketchup or 1 tablespoon of fat free sour cream (15)    ¨ 1 teaspoon of mustard (5)    ¨ ¼ cup of salsa (20)    ¨ 1 large dill pickle (15)    ¨ 1 tablespoon of fat free salad dressing (10)    ¨ 2 teaspoons of low-sugar, light jam or jelly, or 1 tablespoon of sugar-free syrup (15)    ¨ 1 sugar-free popsicle (15)    ¨ 1 cup of club soda, seltzer water, or diet soda (0)  CARE AGREEMENT:   You have the right to help plan your care  Discuss treatment options with your caregivers to decide what care you want to receive  You always have the right to refuse treatment  The above information is an  only  It is not intended as medical advice for individual conditions or treatments  Talk to your doctor, nurse or pharmacist before following any medical regimen to see if it is safe and effective for you  © 2017 [address] [employer] Information is for End User's use only and may not be sold, redistributed or otherwise used for commercial purposes  All illustrations and images included in CareNotes® are the copyrighted property of [employer]  or Newton Anika  Try to drop back to your usual weight in the next couple of months this will help the blood pressure  AVOID THESE HIGH SALT FOODS:    SNACKS THAT TASTE SALTY: PRETZELS/CHIPSPOPCORN  CANNED SOUPS AND VEGGIES  FROZEN FOODS/ MICROWAVEABLE FOODS/ CANNED FOODS    AVOID ADDING EXTRA SALT TO THE MEALS THESE THINGS HELP YOU LOSE WEIGHT:    REDUCE PORTIONS  DRINK WATER CONSTANTLY THROUGHOUT YOUR MEALS  ELIMINATE SWEET DRINKS  WEIGH DAILY AND KEEP A "VISUAL" CHART OF PROGRESS  REDUCE CARBOHYDRATES: PASTA/BREADS/BAGELS/DONUTS/ RICE ETC  DO SOME WALKING OR EXERCISE EVERY DAY FOR 20-30 MINUTES      Call if blood pressure > 145/90 consistently

## 2020-01-23 NOTE — PROGRESS NOTES
BMI Counseling: Body mass index is 41 51 kg/m²  The BMI is above normal  Nutrition recommendations include reducing portion sizes, decreasing overall calorie intake, 3-5 servings of fruits/vegetables daily, reducing fast food intake, consuming healthier snacks, decreasing soda and/or juice intake, moderation in carbohydrate intake, increasing intake of lean protein, reducing intake of saturated fat and trans fat and reducing intake of cholesterol  Exercise recommendations include exercising 3-5 times per week

## 2020-01-23 NOTE — PROGRESS NOTES
FAMILY PRACTICE PRE-OPERATIVE EVALUATION  [facility] PHYSICIAN GROUP - [facility]    NAME: Ban Toussaint  AGE: 62 y o  SEX: male  : 1965     DATE: 2020    Family Practice Pre-Operative Evaluation      Chief Complaint: Pre-operative Evaluation     Surgery: cataract w/ gen anesthesia: L side   Anticipated Date of Surgery: 20     History of Present Illness:     Patient has no prior history of bleeding issues or blood clots  Chronic conditions, medications and allergies were reviewed  There is no currently active infectious process  Assessment of Cardiac Risk:  · No unstable or severe angina or MI in the last 6 weeks or history of stent placement in the last year   · No decompensated heart failure (e g  New onset heart failure, NYHA functional class IV heart failure, or worsening existing heart failure) in past 3 mos  · No severe heart valve disease including aortic stenosis or symptomatic mitral stenosis     Exercise Capacity:  · Able to walk 4 blocks without symptoms  · Able to walk 2 flights without symptoms    NO Prior Anesthesia Reactions       No prolonged steroid use in past 6 mos  P A T  If done reviewed  Review of Systems:     Review of Systems   Constitutional: Negative for activity change and appetite change  HENT: Negative for trouble swallowing  Eyes: Positive for visual disturbance  Chronic nystagmus   Respiratory: Negative for cough and shortness of breath  Cardiovascular: Negative for chest pain, palpitations and leg swelling  Gastrointestinal: Negative for abdominal pain and blood in stool  Endocrine: Negative for polyuria  Genitourinary: Negative for difficulty urinating and hematuria  Skin: Negative for rash  Neurological: Negative for dizziness  Psychiatric/Behavioral: Negative for behavioral problems         Current Problem List:     Patient Active Problem List   Diagnosis    Cervical spinal stenosis    Congenital nystagmus    Hypertension    Sleep apnea    Hyperglycemia    Pneumonia of left lower lobe due to infectious organism [facility])    Other male erectile dysfunction    Skin tags, multiple acquired       Allergies: Allergies   Allergen Reactions    No Active Allergies        Current Medications:       Current Outpatient Medications:     albuterol (PROVENTIL HFA,VENTOLIN HFA) 90 mcg/act inhaler, inhale 2 puff by inhalation route  every 4 - 6 hours as needed, Disp: , Rfl:     Bromfenac Sodium, Once-Daily, 0 09 % SOLN, INSTILL 1 DROP INTO LEFT EYE NIGHTLY, Disp: , Rfl:     fluticasone-salmeterol (ADVAIR HFA) 230-21 MCG/ACT inhaler, Inhale 2 puffs 2 (two) times a day Rinse mouth after use , Disp: 3 Inhaler, Rfl: 3    lisinopril (ZESTRIL) 20 mg tablet, Take 1 tablet (20 mg total) by mouth daily, Disp: 90 tablet, Rfl: 3    ofloxacin (OCUFLOX) 0 3 % ophthalmic solution, INSTILL 1 DROP INTO LEFT EYE 4 TIMES DAILY, Disp: , Rfl:     prednisoLONE acetate (PRED FORTE) 1 % ophthalmic suspension, INSTILL 1 DROP INTO LEFT EYE THREE TIMES DAILY, Disp: , Rfl:     tadalafil (CIALIS) 20 MG tablet, Take 1 tablet (20 mg total) by mouth daily as needed for erectile dysfunction, Disp: 10 tablet, Rfl: 12    Past Medical History:       Past Medical History:   Diagnosis Date    Asthma     hospitalization    Hypertension 2/1/2006    Onychomycosis of toenail     Pneumonia of left lower lobe due to infectious organism ([facility] [address] ) 11/30/2018        History reviewed  No pertinent surgical history       Family History   Problem Relation Age of Onset    Hypertension Family         Social History     Socioeconomic History    Marital status: /Civil Union     Spouse name: Not on file    Number of children: Not on file    Years of education: Not on file    Highest education level: Not on file   Occupational History    Not on file   Social Needs    Financial resource strain: Not on file    Food insecurity:     Worry: Not on file     Inability: Not on file    Transportation needs:     Medical: Not on file     Non-medical: Not on file   Tobacco Use    Smoking status: Never Smoker    Smokeless tobacco: Never Used   Substance and Sexual Activity    Alcohol use: Yes     Frequency: Monthly or less     Comment: social    Drug use: No    Sexual activity: Not on file   Lifestyle    Physical activity:     Days per week: Not on file     Minutes per session: Not on file    Stress: Not on file   Relationships    Social connections:     Talks on phone: Not on file     Gets together: Not on file     Attends Episcopal service: Not on file     Active member of club or organization: Not on file     Attends meetings of clubs or organizations: Not on file     Relationship status: Not on file    Intimate partner violence:     Fear of current or ex partner: Not on file     Emotionally abused: Not on file     Physically abused: Not on file     Forced sexual activity: Not on file   Other Topics Concern    Not on file   Social History Narrative    Not on file        Physical Exam:     /90 (BP Location: Left arm, Patient Position: Sitting, Cuff Size: Large)   Pulse (!) 52   Ht 5' 8" (1 727 m)   Wt 124 kg (273 lb)   SpO2 96%   BMI 41 51 kg/m²     Physical Exam   Constitutional: He appears well-developed and well-nourished  HENT:   Head: Normocephalic and atraumatic  Eyes: Conjunctivae are normal    Neck: Neck supple  No thyromegaly present  Cardiovascular: Normal rate, regular rhythm, normal heart sounds and intact distal pulses  No murmur heard  Pulmonary/Chest: Effort normal and breath sounds normal  No respiratory distress  Musculoskeletal: He exhibits no edema  Lymphadenopathy:     He has no cervical adenopathy  Skin: Skin is warm and dry  Psychiatric: He has a normal mood and affect  His behavior is normal        Operative site has been examined and clear of skin infection and inflammation          Assessment & Recommendations:     Patient is cleared for surgery as detailed above       Surgical Procedure risk category: low     Patient specific operative risk categegory: low

## 2020-01-24 LAB
EST. AVERAGE GLUCOSE BLD GHB EST-MCNC: 131 MG/DL
HBA1C MFR BLD: 6.2 % (ref 4.2–6.3)
HCV AB SER QL: NORMAL

## 2020-01-24 PROCEDURE — 3044F HG A1C LEVEL LT 7.0%: CPT | Performed by: FAMILY MEDICINE

## 2020-01-26 LAB — HIV 1+2 AB+HIV1 P24 AG SERPL QL IA: NORMAL

## 2020-03-06 DIAGNOSIS — I10 ESSENTIAL HYPERTENSION: ICD-10-CM

## 2020-03-08 RX ORDER — LISINOPRIL 20 MG/1
TABLET ORAL
Qty: 90 TABLET | Refills: 0 | Status: SHIPPED | OUTPATIENT
Start: 2020-03-08 | End: 2020-03-09

## 2020-03-09 ENCOUNTER — TELEPHONE (OUTPATIENT)
Dept: FAMILY MEDICINE CLINIC | Facility: CLINIC | Age: 55
End: 2020-03-09

## 2020-03-09 DIAGNOSIS — I10 ESSENTIAL HYPERTENSION: ICD-10-CM

## 2020-03-09 RX ORDER — LISINOPRIL 20 MG/1
TABLET ORAL
Qty: 90 TABLET | Refills: 0 | Status: SHIPPED | OUTPATIENT
Start: 2020-03-09 | End: 2020-05-03

## 2020-04-23 ENCOUNTER — TELEPHONE (OUTPATIENT)
Dept: FAMILY MEDICINE CLINIC | Facility: CLINIC | Age: 55
End: 2020-04-23

## 2020-04-24 ENCOUNTER — TELEMEDICINE (OUTPATIENT)
Dept: FAMILY MEDICINE CLINIC | Facility: CLINIC | Age: 55
End: 2020-04-24
Payer: COMMERCIAL

## 2020-04-24 DIAGNOSIS — I10 ESSENTIAL HYPERTENSION: Primary | ICD-10-CM

## 2020-04-24 PROCEDURE — 99214 OFFICE O/P EST MOD 30 MIN: CPT | Performed by: FAMILY MEDICINE

## 2020-04-24 RX ORDER — LISINOPRIL 40 MG/1
40 TABLET ORAL DAILY
Qty: 90 TABLET | Refills: 3 | Status: SHIPPED | OUTPATIENT
Start: 2020-04-24 | End: 2020-05-03 | Stop reason: SDUPTHER

## 2020-05-01 ENCOUNTER — TELEPHONE (OUTPATIENT)
Dept: FAMILY MEDICINE CLINIC | Facility: CLINIC | Age: 55
End: 2020-05-01

## 2020-05-03 ENCOUNTER — TREATMENT (OUTPATIENT)
Dept: FAMILY MEDICINE CLINIC | Facility: CLINIC | Age: 55
End: 2020-05-03

## 2020-05-03 DIAGNOSIS — I10 ESSENTIAL HYPERTENSION: ICD-10-CM

## 2020-05-03 RX ORDER — LISINOPRIL 20 MG/1
40 TABLET ORAL DAILY
Qty: 180 TABLET | Refills: 3 | Status: SHIPPED | OUTPATIENT
Start: 2020-05-03 | End: 2021-10-27

## 2020-07-23 ENCOUNTER — OFFICE VISIT (OUTPATIENT)
Dept: FAMILY MEDICINE CLINIC | Facility: CLINIC | Age: 55
End: 2020-07-23
Payer: COMMERCIAL

## 2020-07-23 VITALS
TEMPERATURE: 97.9 F | DIASTOLIC BLOOD PRESSURE: 90 MMHG | WEIGHT: 277.8 LBS | OXYGEN SATURATION: 96 % | BODY MASS INDEX: 42.1 KG/M2 | SYSTOLIC BLOOD PRESSURE: 140 MMHG | HEIGHT: 68 IN | HEART RATE: 56 BPM

## 2020-07-23 DIAGNOSIS — G47.30 SLEEP APNEA, UNSPECIFIED TYPE: ICD-10-CM

## 2020-07-23 DIAGNOSIS — R73.9 HYPERGLYCEMIA: ICD-10-CM

## 2020-07-23 DIAGNOSIS — L30.9 DERMATITIS: ICD-10-CM

## 2020-07-23 DIAGNOSIS — E66.01 MORBID OBESITY WITH BMI OF 40.0-44.9, ADULT (HCC): Primary | ICD-10-CM

## 2020-07-23 DIAGNOSIS — I10 ESSENTIAL HYPERTENSION: ICD-10-CM

## 2020-07-23 PROBLEM — J18.9 PNEUMONIA OF LEFT LOWER LOBE DUE TO INFECTIOUS ORGANISM: Status: RESOLVED | Noted: 2018-11-30 | Resolved: 2020-07-23

## 2020-07-23 LAB
ANION GAP SERPL CALCULATED.3IONS-SCNC: 7 MMOL/L (ref 4–13)
BUN SERPL-MCNC: 12 MG/DL (ref 5–25)
CALCIUM SERPL-MCNC: 8.8 MG/DL (ref 8.3–10.1)
CHLORIDE SERPL-SCNC: 107 MMOL/L (ref 100–108)
CO2 SERPL-SCNC: 25 MMOL/L (ref 21–32)
CREAT SERPL-MCNC: 1.06 MG/DL (ref 0.6–1.3)
EST. AVERAGE GLUCOSE BLD GHB EST-MCNC: 131 MG/DL
GFR SERPL CREATININE-BSD FRML MDRD: 90 ML/MIN/1.73SQ M
GLUCOSE SERPL-MCNC: 95 MG/DL (ref 65–140)
HBA1C MFR BLD: 6.2 %
POTASSIUM SERPL-SCNC: 4 MMOL/L (ref 3.5–5.3)
SODIUM SERPL-SCNC: 139 MMOL/L (ref 136–145)

## 2020-07-23 PROCEDURE — 3008F BODY MASS INDEX DOCD: CPT | Performed by: FAMILY MEDICINE

## 2020-07-23 PROCEDURE — 3077F SYST BP >= 140 MM HG: CPT | Performed by: FAMILY MEDICINE

## 2020-07-23 PROCEDURE — 83036 HEMOGLOBIN GLYCOSYLATED A1C: CPT | Performed by: FAMILY MEDICINE

## 2020-07-23 PROCEDURE — 99396 PREV VISIT EST AGE 40-64: CPT | Performed by: FAMILY MEDICINE

## 2020-07-23 PROCEDURE — 80048 BASIC METABOLIC PNL TOTAL CA: CPT | Performed by: FAMILY MEDICINE

## 2020-07-23 PROCEDURE — 3080F DIAST BP >= 90 MM HG: CPT | Performed by: FAMILY MEDICINE

## 2020-07-23 PROCEDURE — 36415 COLL VENOUS BLD VENIPUNCTURE: CPT | Performed by: FAMILY MEDICINE

## 2020-07-23 RX ORDER — TRIAMCINOLONE ACETONIDE 1 MG/G
CREAM TOPICAL 2 TIMES DAILY
Qty: 15 G | Refills: 3 | Status: SHIPPED | OUTPATIENT
Start: 2020-07-23

## 2020-07-23 NOTE — PROGRESS NOTES
[facility] PRACTICE PRE-OPERATIVE EVALUATION  [facility] PHYSICIAN GROUP - [facility]    NAME: Rosa Smith  AGE: 62 y o  SEX: male  : 1965     DATE: 2020    Family Practice Pre-Operative Evaluation      Chief Complaint: Pre-operative Evaluation     Surgery: R cataract   Anticipated Date of Surgery: 20     History of Present Illness:     Patient has no prior history of bleeding issues or blood clots  Chronic conditions, medications and allergies were reviewed  There is no currently active infectious process  Assessment of Cardiac Risk:  · No unstable or severe angina or MI in the last 6 weeks or history of stent placement in the last year   · No decompensated heart failure (e g  New onset heart failure, NYHA functional class IV heart failure, or worsening existing heart failure) in past 3 mos  · No severe heart valve disease including aortic stenosis or symptomatic mitral stenosis     Exercise Capacity:  · Able to walk 4 blocks without symptoms  · Able to walk 2 flights without symptoms    NO Prior Anesthesia Reactions       No prolonged steroid use in past 6 mos  P A T  If done reviewed  Review of Systems:     Review of Systems   Constitutional: Negative for activity change and appetite change  HENT: Negative for trouble swallowing  Eyes: Negative for visual disturbance  Respiratory: Negative for cough and shortness of breath  Cardiovascular: Negative for chest pain, palpitations and leg swelling  Gastrointestinal: Negative for abdominal pain and blood in stool  Endocrine: Negative for polyuria  Genitourinary: Negative for difficulty urinating and hematuria  Skin: Negative for rash  Neurological: Negative for dizziness  Psychiatric/Behavioral: Negative for behavioral problems         Current Problem List:     Patient Active Problem List   Diagnosis    Cervical spinal stenosis    Congenital nystagmus    Hypertension    Sleep apnea    Hyperglycemia    Other male erectile dysfunction    Skin tags, multiple acquired       Allergies: Allergies   Allergen Reactions    No Active Allergies        Current Medications:       Current Outpatient Medications:     albuterol (PROVENTIL HFA,VENTOLIN HFA) 90 mcg/act inhaler, inhale 2 puff by inhalation route  every 4 - 6 hours as needed, Disp: , Rfl:     fluticasone-salmeterol (ADVAIR HFA) 230-21 MCG/ACT inhaler, Inhale 2 puffs 2 (two) times a day Rinse mouth after use , Disp: 3 Inhaler, Rfl: 3    lisinopril (ZESTRIL) 20 mg tablet, Take 2 tablets (40 mg total) by mouth daily, Disp: 180 tablet, Rfl: 3    Past Medical History:       Past Medical History:   Diagnosis Date    Asthma     hospitalization    Hypertension 2/1/2006    Onychomycosis of toenail     Pneumonia of left lower lobe due to infectious organism 11/30/2018        History reviewed  No pertinent surgical history       Family History   Problem Relation Age of Onset    Hypertension Family         Social History     Socioeconomic History    Marital status: /Civil Union     Spouse name: Not on file    Number of children: Not on file    Years of education: Not on file    Highest education level: Not on file   Occupational History    Not on file   Social Needs    Financial resource strain: Not on file    Food insecurity:     Worry: Not on file     Inability: Not on file    Transportation needs:     Medical: Not on file     Non-medical: Not on file   Tobacco Use    Smoking status: Never Smoker    Smokeless tobacco: Never Used   Substance and Sexual Activity    Alcohol use: Yes     Frequency: Monthly or less     Comment: social    Drug use: No    Sexual activity: Not on file   Lifestyle    Physical activity:     Days per week: Not on file     Minutes per session: Not on file    Stress: Not on file   Relationships    Social connections:     Talks on phone: Not on file     Gets together: Not on file     Attends Taoism service: Not on file     Active member of club or organization: Not on file     Attends meetings of clubs or organizations: Not on file     Relationship status: Not on file    Intimate partner violence:     Fear of current or ex partner: Not on file     Emotionally abused: Not on file     Physically abused: Not on file     Forced sexual activity: Not on file   Other Topics Concern    Not on file   Social History Narrative    Not on file        Physical Exam:     /90 (BP Location: Left arm, Patient Position: Sitting, Cuff Size: Large)   Pulse 56   Temp 97 9 °F (36 6 °C) (Temporal)   Ht 5' 8" (1 727 m)   Wt 126 kg (277 lb 12 8 oz)   SpO2 96%   BMI 42 24 kg/m²     Physical Exam   Constitutional: He appears well-developed and well-nourished  HENT:   Head: Normocephalic and atraumatic  Eyes: Conjunctivae are normal    Neck: Neck supple  No thyromegaly present  Cardiovascular: Normal rate, regular rhythm, normal heart sounds and intact distal pulses  No murmur heard  Pulmonary/Chest: Effort normal and breath sounds normal  No respiratory distress  Musculoskeletal: He exhibits no edema  Lymphadenopathy:     He has no cervical adenopathy  Skin: Skin is warm and dry  Psychiatric: He has a normal mood and affect  His behavior is normal        Operative site has been examined and clear of skin infection and inflammation  Assessment & Recommendations:     Patient is cleared for surgery as detailed above       Surgical Procedure risk category: low    Patient specific operative risk categegory: low

## 2020-07-23 NOTE — PATIENT INSTRUCTIONS
Weight Management   AMBULATORY CARE:   Why it is important to manage your weight:  Being overweight increases your risk of health conditions such as heart disease, high blood pressure, type 2 diabetes, and certain types of cancer  It can also increase your risk for osteoarthritis, sleep apnea, and other respiratory problems  Aim for a slow, steady weight loss  Even a small amount of weight loss can lower your risk of health problems  How to lose weight safely:  A safe and healthy way to lose weight is to eat fewer calories and get regular exercise  You can lose up about 1 pound a week by decreasing the number of calories you eat by 500 calories each day  You can decrease calories by eating smaller portion sizes or by cutting out high-calorie foods  Read labels to find out how many calories are in the foods you eat  You can also burn calories with exercise such as walking, swimming, or biking  You will be more likely to keep weight off if you make these changes part of your lifestyle  Healthy meal plan for weight management:  A healthy meal plan includes a variety of foods, contains fewer calories, and helps you stay healthy  A healthy meal plan includes the following:  · Eat whole-grain foods more often  A healthy meal plan should contain fiber  Fiber is the part of grains, fruits, and vegetables that is not broken down by your body  Whole-grain foods are healthy and provide extra fiber in your diet  Some examples of whole-grain foods are whole-wheat breads and pastas, oatmeal, brown rice, and bulgur  · Eat a variety of vegetables every day  Include dark, leafy greens such as spinach, kale, khanh greens, and mustard greens  Eat yellow and orange vegetables such as carrots, sweet potatoes, and winter squash  · Eat a variety of fruits every day  Choose fresh or canned fruit (canned in its own juice or light syrup) instead of juice  Fruit juice has very little or no fiber  · Eat low-fat dairy foods  Drink fat-free (skim) milk or 1% milk  Eat fat-free yogurt and low-fat cottage cheese  Try low-fat cheeses such as mozzarella and other reduced-fat cheeses  · Choose meat and other protein foods that are low in fat  Choose beans or other legumes such as split peas or lentils  Choose fish, skinless poultry (chicken or turkey), or lean cuts of red meat (beef or pork)  Before you cook meat or poultry, cut off any visible fat  · Use less fat and oil  Try baking foods instead of frying them  Add less fat, such as margarine, sour cream, regular salad dressing and mayonnaise to foods  Eat fewer high-fat foods  Some examples of high-fat foods include french fries, doughnuts, ice cream, and cakes  · Eat fewer sweets  Limit foods and drinks that are high in sugar  This includes candy, cookies, regular soda, and sweetened drinks  Ways to decrease calories:   · Eat smaller portions  ¨ Use a small plate with smaller servings  ¨ Do not eat second helpings  ¨ When you eat at a restaurant, ask for a box and place half of your meal in the box before you eat  ¨ Share an entrée with someone else  · Replace high-calorie snacks with healthy, low-calorie snacks  ¨ Choose fresh fruit, vegetables, fat-free rice cakes, or air-popped popcorn instead of potato chips, nuts, or chocolate  ¨ Choose water or calorie-free drinks instead of soda or sweetened drinks  · Eat regular meals  Skipping meals can lead to overeating later in the day  Eat a healthy snack in place of a meal if you do not have time to eat a regular meal      · Do not shop for groceries when you are hungry  You may be more likely to make unhealthy food choices  Take a grocery list of healthy foods and shop after you have eaten  Exercise:  Exercise at least 30 minutes per day on most days of the week  Some examples of exercise include walking, biking, dancing, and swimming   You can also fit in more physical activity by taking the stairs instead of the elevator or parking farther away from stores  Ask your healthcare provider about the best exercise plan for you  Other things to consider as you try to lose weight:   · Be aware of situations that may give you the urge to overeat, such as eating while watching television  Find ways to avoid these situations  For example, read a book, go for a walk, or do crafts  · Meet with a weight loss support group or friends who are also trying to lose weight  This may help you stay motivated to continue working on your weight loss goals  © 2017 [facility] [facility] Information is for End User's use only and may not be sold, redistributed or otherwise used for commercial purposes  All illustrations and images included in CareNotes® are the copyrighted property of A D A M [employer] Inc  or Newton Donaldson  The above information is an  only  It is not intended as medical advice for individual conditions or treatments  Talk to your doctor, nurse or pharmacist before following any medical regimen to see if it is safe and effective for you  Heart Healthy Diet   AMBULATORY CARE:   A heart healthy diet  is an eating plan low in total fat, unhealthy fats, and sodium (salt)  A heart healthy diet helps decrease your risk for heart disease and stroke  Limit the amount of fat you eat to 25% to 35% of your total daily calories  Limit sodium to less than 2,300 mg each day  Healthy fats:  Healthy fats can help improve cholesterol levels  The risk for heart disease is decreased when cholesterol levels are normal  Choose healthy fats, such as the following:  · Unsaturated fat  is found in foods such as soybean, canola, olive, corn, and safflower oils  It is also found in soft tub margarine that is made with liquid vegetable oil  · Omega-3 fat  is found in certain fish, such as salmon, tuna, and trout, and in walnuts and flaxseed    Unhealthy fats:  Unhealthy fats can cause unhealthy cholesterol levels in your blood and increase your risk of heart disease  Limit unhealthy fats, such as the following:  · Cholesterol  is found in animal foods, such as eggs and lobster, and in dairy products made from whole milk  Limit cholesterol to less than 300 milligrams (mg) each day  You may need to limit cholesterol to 200 mg each day if you have heart disease  · Saturated fat  is found in meats, such as enamorado and hamburger  It is also found in chicken or turkey skin, whole milk, and butter  Limit saturated fat to less than 7% of your total daily calories  Limit saturated fat to less than 6% if you have heart disease or are at increased risk for it  · Trans fat  is found in packaged foods, such as potato chips and cookies  It is also in hard margarine, some fried foods, and shortening  Avoid trans fats as much as possible    Heart healthy foods and drinks to include:  Ask your dietitian or healthcare provider how many servings to have from each of the following food groups:  · Grains:      ¨ Whole-wheat breads, cereals, and pastas, and brown rice    ¨ Low-fat, low-sodium crackers and chips    · Vegetables:      ¨ Broccoli, green beans, green peas, and spinach    ¨ Collards, kale, and lima beans    ¨ Carrots, sweet potatoes, tomatoes, and peppers    ¨ Canned vegetables with no salt added    · Fruits:      ¨ Bananas, peaches, pears, and pineapple    ¨ Grapes, raisins, and dates    ¨ Oranges, tangerines, grapefruit, orange juice, and grapefruit juice    ¨ Apricots, mangoes, melons, and papaya    ¨ Raspberries and strawberries    ¨ Canned fruit with no added sugar    · Low-fat dairy products:      ¨ Nonfat (skim) milk, 1% milk, and low-fat almond, cashew, or soy milks fortified with calcium    ¨ Low-fat cheese, regular or frozen yogurt, and cottage cheese    · Meats and proteins , such as lean cuts of beef and pork (loin, leg, round), skinless chicken and turkey, legumes, soy products, egg whites, and nuts  Foods and drinks to limit or avoid:  Ask your dietitian or healthcare provider about these and other foods that are high in unhealthy fat, sodium, and sugar:  · Snack or packaged foods , such as frozen dinners, cookies, macaroni and cheese, and cereals with more than 300 mg of sodium per serving    · Canned or dry mixes  for cakes, soups, sauces, or gravies    · Vegetables with added sodium , such as instant potatoes, vegetables with added sauces, or regular canned vegetables    · Other foods high in sodium , such as ketchup, barbecue sauce, salad dressing, pickles, olives, soy sauce, and miso    · High-fat dairy foods  such as whole or 2% milk, cream cheese, or sour cream, and cheeses     · High-fat protein foods  such as high-fat cuts of beef (T-bone steaks, ribs), chicken or turkey with skin, and organ meats, such as liver    · Cured or smoked meats , such as hot dogs, enamorado, and sausage    · Unhealthy fats and oils , such as butter, stick margarine, shortening, and cooking oils such as coconut or palm oil    · Food and drinks high in sugar , such as soft drinks (soda), sports drinks, sweetened tea, candy, cake, cookies, pies, and doughnuts  Other diet guidelines to follow:   · Eat more foods containing omega-3 fats  Eat fish high in omega-3 fats at least 2 times a week  · Limit alcohol  Too much alcohol can damage your heart and raise your blood pressure  Women should limit alcohol to 1 drink a day  Men should limit alcohol to 2 drinks a day  A drink of alcohol is 12 ounces of beer, 5 ounces of wine, or 1½ ounces of liquor  · Choose low-sodium foods  High-sodium foods can lead to high blood pressure  Add little or no salt to food you prepare  Use herbs and spices in place of salt  · Eat more fiber  to help lower cholesterol levels  Eat at least 5 servings of fruits and vegetables each day  Eat 3 ounces of whole-grain foods each day  Legumes (beans) are also a good source of fiber    Lifestyle guidelines: · Do not smoke  Nicotine and other chemicals in cigarettes and cigars can cause lung and heart damage  Ask your healthcare provider for information if you currently smoke and need help to quit  E-cigarettes or smokeless tobacco still contain nicotine  Talk to your healthcare provider before you use these products  · Exercise regularly  to help you maintain a healthy weight and improve your blood pressure and cholesterol levels  Ask your healthcare provider about the best exercise plan for you  Do not start an exercise program without asking your healthcare provider  Follow up with your healthcare provider as directed:  Write down your questions so you remember to ask them during your visits  © 2017 [address] [employer] Information is for End User's use only and may not be sold, redistributed or otherwise used for commercial purposes  All illustrations and images included in CareNotes® are the copyrighted property of [employer]  or Newton Donaldson  The above information is an  only  It is not intended as medical advice for individual conditions or treatments  Talk to your doctor, nurse or pharmacist before following any medical regimen to see if it is safe and effective for you  Calorie Counting Diet   WHAT YOU NEED TO KNOW:   What is a calorie counting diet? It is a meal plan based on counting calories each day to reach a healthy body weight  You will need to eat fewer calories if you are trying to lose weight  Weight loss may decrease your risk for certain health problems or improve your health if you have health problems  Some of these health problems include heart disease, high blood pressure, and diabetes  What foods should I avoid? Your dietitian will tell you if you need to avoid certain foods based on your body weight and health condition  You may need to avoid high-fat foods if you are at risk for or have heart disease   You may need to eat fewer foods from the breads and starches food group if you have diabetes  How many calories are in foods? The following is a list of foods and drinks with the approximate number of calories in each  Check the food label to find the exact number of calories  A dietitian can tell you how many calories you should have from each food group each day    · Carbohydrate:      ¨ ½ of a 3-inch bagel, 1 slice of bread, or ½ of a hamburger bun or hot dog bun (80)    ¨ 1 (8-inch) flour tortilla or ½ cup of cooked rice (100)    ¨ 1 (6-inch) corn tortilla (80)    ¨ 1 (6-inch) pancake or 1 cup of bran flakes cereal (110)    ¨ ½ cup of cooked cereal (80)    ¨ ½ cup of cooked pasta (85)    ¨ 1 ounce of pretzels (100)    ¨ 3 cups of air-popped popcorn without butter or oil (80)    · Dairy:      ¨ 1 cup of skim or 1% milk (90)    ¨ 1 cup of 2% milk (120)    ¨ 1 cup of whole milk (160)    ¨ 1 cup of 2% chocolate milk (220)    ¨ 1 ounce of low-fat cheese with 3 grams of fat per ounce (70)    ¨ 1 ounce of cheddar cheese (114)    ¨ ½ cup of 1% fat cottage cheese (80)    ¨ 1 cup of plain or sugar-free, fat-free yogurt (90)    · Protein foods:      ¨ 3 ounces of fish (not breaded or fried) (95)    ¨ 3 ounces of breaded, fried fish (195)    ¨ ¾ cup of tuna canned in water (105)    ¨ 3 ounces of chicken breast without skin (105)    ¨ 1 fried chicken breast with skin (350)    ¨ ¼ cup of fat free egg substitute (40)    ¨ 1 large egg (75)    ¨ 3 ounces of lean beef or pork (165)    ¨ 3 ounces of fried pork chop or ham (185)    ¨ ½ cup of cooked dried beans, such as kidney, marques, lentils, or navy (115)    ¨ 3 ounces of bologna or lunch meat (225)    ¨ 2 links of breakfast sausage (140)    · Vegetables:      ¨ ½ cup of sliced mushrooms (10)    ¨ 1 cup of salad greens, such as lettuce, spinach, or teresa (15)    ¨ ½ cup of steamed asparagus (20)    ¨ ½ cup of cooked summer squash, zucchini squash, or green or wax beans (25)    ¨ 1 cup of broccoli or cauliflower florets, or 1 medium tomato (25)    ¨ 1 large raw carrot or ½ cup of cooked carrots (40)    ¨ ? of a medium cucumber or 1 stalk of celery (5)    ¨ 1 small baked potato (160)    ¨ 1 cup of breaded, fried vegetables (230)    · Fruit:      ¨ 1 (6-inch) banana (55)     ¨ ½ of a 4-inch grapefruit (55)    ¨ 15 grapes (60)    ¨ 1 medium orange or apple (70)    ¨ 1 large peach (65)    ¨ 1 cup of fresh pineapple chunks (75)    ¨ 1 cup of melon cubes (50)    ¨ 1¼ cups of whole strawberries (45)    ¨ ½ cup of fruit canned in juice (55)    ¨ ½ cup of fruit canned in heavy syrup (110)    ¨ ?  cup of raisins (130)    ¨ ½ cup of unsweetened fruit juice (60)    ¨ ½ cup of grape, cranberry, or prune juice (90)    · Fat:      ¨ 10 peanuts or 2 teaspoons of peanut butter (55)    ¨ 2 tablespoons of avocado or 1 tablespoon of regular salad dressing (45)    ¨ 2 slices of enamorado (90)    ¨ 1 teaspoon of oil, such as safflower, canola, corn, or olive oil (45)    ¨ 2 teaspoons of low-fat margarine, or 1 tablespoon of low-fat mayonnaise (50)    ¨ 1 teaspoon of regular margarine (40)    ¨ 1 tablespoon of regular mayonnaise (135)    ¨ 1 tablespoon of cream cheese or 2 tablespoons of low-fat cream cheese (45)    ¨ 2 tablespoons of vegetable shortening (215)    · Dessert and sweets:      ¨ 8 animal crackers or 5 vanilla wafers (80)    ¨ 1 frozen fruit juice bar (80)    ¨ ½ cup of ice milk or low-fat frozen yogurt (90)    ¨ ½ cup of sherbet or sorbet (125)    ¨ ½ cup of sugar-free pudding or custard (60)    ¨ ½ cup of ice cream (140)    ¨ ½ cup of pudding or custard (175)    ¨ 1 (2-inch) square chocolate brownie (185)    · Combination foods:      ¨ Bean burrito made with an 8-inch tortilla, without cheese (275)    ¨ Chicken breast sandwich with lettuce and tomato (325)    ¨ 1 cup of chicken noodle soup (60)    ¨ 1 beef taco (175)    ¨ Regular hamburger with lettuce and tomato (310)    ¨ Regular cheeseburger with lettuce and tomato (410)     ¨ ¼ of a 12-inch cheese pizza (280)    ¨ Fried fish sandwich with lettuce and tomato (425)    ¨ Hot dog and bun (275)    ¨ 1½ cups of macaroni and cheese (310)    ¨ Taco salad with a fried tortilla shell (870)    · Low-calorie foods:      ¨ 1 tablespoon of ketchup or 1 tablespoon of fat free sour cream (15)    ¨ 1 teaspoon of mustard (5)    ¨ ¼ cup of salsa (20)    ¨ 1 large dill pickle (15)    ¨ 1 tablespoon of fat free salad dressing (10)    ¨ 2 teaspoons of low-sugar, light jam or jelly, or 1 tablespoon of sugar-free syrup (15)    ¨ 1 sugar-free popsicle (15)    ¨ 1 cup of club soda, seltzer water, or diet soda (0)  CARE AGREEMENT:   You have the right to help plan your care  Discuss treatment options with your caregivers to decide what care you want to receive  You always have the right to refuse treatment  The above information is an  only  It is not intended as medical advice for individual conditions or treatments  Talk to your doctor, nurse or pharmacist before following any medical regimen to see if it is safe and effective for you  © 2017 [address] [employer] Information is for End User's use only and may not be sold, redistributed or otherwise used for commercial purposes  All illustrations and images included in CareNotes® are the copyrighted property of [employer]  or Newton [employer]onaldson  TO AVOID COVID (CORONAVIRUS) INFECTION THE FOLLOWING ARE HELPFUL:    1  Avoid areas where there are a lot of people  Examples would be small restaurants churches small grocery stores etc   Keep 6 feet between you  2  If you must go out around people use a mask  When around people use a hand  after touching surfaces  Important areas are grocery stores, gas pumps, pharmacies, [place], etc  Remember: masks protect the "other mary"  They are not a substitute for staying 6 feet away  3  KEEP YOUR HANDS AWAY FROM YOUR FACE    IT IS EXTREMELY DIFFICULT TO GET COVID INFECTION IF YOU DO NOT TOUCH YOUR FACE AND ARE NOT AROUND PEOPLE  3  If you wish to work in the yard or walk around the neighborhood or in a park and you are not around a lot of people it is okay to keep your mask in your pocket  Casually passing someone without a mask does not put you at risk  4  Try to avoid having people coming in and out of your home  This would include cleaning personnel delivery people unnecessary service people etc   after anyone comes into your home including family be careful to wipe all surfaces with a home     Check with your eye surgeon and see if you require COVID testing before the procedure  If you do let me know  There is about a 7 or 8 day turnaround time right now for this test result       THESE THINGS HELP YOU LOSE WEIGHT:    REDUCE PORTIONS  DRINK WATER CONSTANTLY THROUGHOUT YOUR MEALS  ELIMINATE SWEET DRINKS  WEIGH DAILY AND KEEP A "VISUAL" CHART OF PROGRESS  REDUCE CARBOHYDRATES: PASTA/BREADS/BAGELS/DONUTS/ RICE ETC  DO SOME WALKING OR EXERCISE EVERY DAY FOR 20-30 MINUTES

## 2020-07-23 NOTE — PROGRESS NOTES
BMI Counseling: Body mass index is 42 24 kg/m²  The BMI is above normal  Nutrition recommendations include reducing portion sizes, decreasing overall calorie intake, 3-5 servings of fruits/vegetables daily, reducing fast food intake, consuming healthier snacks, decreasing soda and/or juice intake, moderation in carbohydrate intake, increasing intake of lean protein, reducing intake of saturated fat and trans fat and reducing intake of cholesterol  Exercise recommendations include exercising 3-5 times per week

## 2020-11-05 ENCOUNTER — TELEMEDICINE (OUTPATIENT)
Dept: FAMILY MEDICINE CLINIC | Facility: CLINIC | Age: 55
End: 2020-11-05
Payer: COMMERCIAL

## 2020-11-05 DIAGNOSIS — Z20.822 ENCOUNTER FOR SCREENING LABORATORY TESTING FOR COVID-19 VIRUS: Primary | ICD-10-CM

## 2020-11-05 DIAGNOSIS — Z20.822 ENCOUNTER FOR SCREENING LABORATORY TESTING FOR COVID-19 VIRUS: ICD-10-CM

## 2020-11-05 PROCEDURE — 99213 OFFICE O/P EST LOW 20 MIN: CPT | Performed by: NURSE PRACTITIONER

## 2020-11-05 PROCEDURE — U0003 INFECTIOUS AGENT DETECTION BY NUCLEIC ACID (DNA OR RNA); SEVERE ACUTE RESPIRATORY SYNDROME CORONAVIRUS 2 (SARS-COV-2) (CORONAVIRUS DISEASE [COVID-19]), AMPLIFIED PROBE TECHNIQUE, MAKING USE OF HIGH THROUGHPUT TECHNOLOGIES AS DESCRIBED BY CMS-2020-01-R: HCPCS | Performed by: NURSE PRACTITIONER

## 2020-11-06 LAB — SARS-COV-2 RNA SPEC QL NAA+PROBE: DETECTED

## 2020-11-07 ENCOUNTER — TELEMEDICINE (OUTPATIENT)
Dept: FAMILY MEDICINE CLINIC | Facility: CLINIC | Age: 55
End: 2020-11-07
Payer: COMMERCIAL

## 2020-11-07 DIAGNOSIS — U07.1 COVID-19 VIRUS INFECTION: Primary | ICD-10-CM

## 2020-11-07 PROCEDURE — 99213 OFFICE O/P EST LOW 20 MIN: CPT | Performed by: NURSE PRACTITIONER

## 2020-11-09 ENCOUNTER — TELEMEDICINE (OUTPATIENT)
Dept: FAMILY MEDICINE CLINIC | Facility: CLINIC | Age: 55
End: 2020-11-09
Payer: COMMERCIAL

## 2020-11-09 DIAGNOSIS — U07.1 COVID-19 VIRUS INFECTION: ICD-10-CM

## 2020-11-09 PROCEDURE — 1036F TOBACCO NON-USER: CPT | Performed by: NURSE PRACTITIONER

## 2020-11-09 PROCEDURE — 99213 OFFICE O/P EST LOW 20 MIN: CPT | Performed by: NURSE PRACTITIONER

## 2020-11-16 ENCOUNTER — TELEPHONE (OUTPATIENT)
Dept: FAMILY MEDICINE CLINIC | Facility: CLINIC | Age: 55
End: 2020-11-16

## 2020-11-16 ENCOUNTER — DOCUMENTATION (OUTPATIENT)
Dept: FAMILY MEDICINE CLINIC | Facility: CLINIC | Age: 55
End: 2020-11-16

## 2021-02-09 ENCOUNTER — OFFICE VISIT (OUTPATIENT)
Dept: FAMILY MEDICINE CLINIC | Facility: CLINIC | Age: 56
End: 2021-02-09
Payer: COMMERCIAL

## 2021-02-09 VITALS
SYSTOLIC BLOOD PRESSURE: 148 MMHG | DIASTOLIC BLOOD PRESSURE: 90 MMHG | TEMPERATURE: 98 F | HEART RATE: 58 BPM | WEIGHT: 266.7 LBS | OXYGEN SATURATION: 99 % | HEIGHT: 68 IN | BODY MASS INDEX: 40.42 KG/M2

## 2021-02-09 DIAGNOSIS — Z23 ENCOUNTER FOR IMMUNIZATION: ICD-10-CM

## 2021-02-09 DIAGNOSIS — Z12.11 SCREEN FOR COLON CANCER: ICD-10-CM

## 2021-02-09 DIAGNOSIS — Z12.12 SCREENING FOR COLORECTAL CANCER: ICD-10-CM

## 2021-02-09 DIAGNOSIS — Z12.11 SCREENING FOR COLORECTAL CANCER: ICD-10-CM

## 2021-02-09 DIAGNOSIS — U07.1 COVID-19 VIRUS INFECTION: ICD-10-CM

## 2021-02-09 DIAGNOSIS — G47.30 SLEEP APNEA, UNSPECIFIED TYPE: ICD-10-CM

## 2021-02-09 DIAGNOSIS — R73.9 HYPERGLYCEMIA: Primary | ICD-10-CM

## 2021-02-09 DIAGNOSIS — I10 ESSENTIAL HYPERTENSION: ICD-10-CM

## 2021-02-09 LAB — SL AMB POCT HEMOGLOBIN AIC: 5.9 (ref ?–6.5)

## 2021-02-09 PROCEDURE — 99214 OFFICE O/P EST MOD 30 MIN: CPT | Performed by: FAMILY MEDICINE

## 2021-02-09 PROCEDURE — 3077F SYST BP >= 140 MM HG: CPT | Performed by: FAMILY MEDICINE

## 2021-02-09 PROCEDURE — 83036 HEMOGLOBIN GLYCOSYLATED A1C: CPT | Performed by: FAMILY MEDICINE

## 2021-02-09 PROCEDURE — 3008F BODY MASS INDEX DOCD: CPT | Performed by: FAMILY MEDICINE

## 2021-02-09 PROCEDURE — 3725F SCREEN DEPRESSION PERFORMED: CPT | Performed by: FAMILY MEDICINE

## 2021-02-09 PROCEDURE — 1036F TOBACCO NON-USER: CPT | Performed by: FAMILY MEDICINE

## 2021-02-09 PROCEDURE — 3080F DIAST BP >= 90 MM HG: CPT | Performed by: FAMILY MEDICINE

## 2021-02-09 NOTE — PROGRESS NOTES
Assessment/Plan:    No problem-specific Assessment & Plan notes found for this encounter  Diagnoses and all orders for this visit:    Essential hypertension    Encounter for immunization    Screening for colorectal cancer    COVID-19 virus infection    Hyperglycemia    Sleep apnea, unspecified type    Other orders  -     Cancel: TDAP VACCINE GREATER THAN OR EQUAL TO 6YO IM  -     Cancel: Ambulatory referral to Gastroenterology; Future  -     Cancel: influenza vaccine, quadrivalent, recombinant, PF, 0 5 mL, for patients 18 yr+ (FLUBLOK)          Subjective:      Patient ID: Mulu Felipe is a 60 y o  male  PATIENT RETURNS FOR FOLLOW-UP OF CHRONIC MEDICAL CONDITIONS  NO HOSPITAL STAYS OR EMERGENCY VISITS RECENTLY  MEDS WERE REVIEWED AND NO SIDE EFFECTS  NO NEW ISSUES  UNLESS NOTED BELOW  NO NEW MEDICAL PROVIDER REPORTED  THE CHRONIC DISEASES LISTED ABOVE ARE STABLE AND UNCHANGED/ THE PLAN OF CARE FOR THOSE WILL REMAIN UNCHANGED UNLESS NOTED BELOW  The following portions of the patient's history were reviewed and updated as appropriate: allergies, current medications, past family history, past medical history, past social history, past surgical history and problem list     Review of Systems   Constitutional: Negative for activity change and appetite change  HENT: Negative for trouble swallowing  Eyes: Negative for visual disturbance  Respiratory: Negative for cough and shortness of breath  Cardiovascular: Negative for chest pain, palpitations and leg swelling  Gastrointestinal: Negative for abdominal pain and blood in stool  Endocrine: Negative for polyuria  Genitourinary: Negative for difficulty urinating and hematuria  Skin: Negative for rash  Neurological: Negative for dizziness  Psychiatric/Behavioral: Negative for behavioral problems           Objective:  Vitals:    02/09/21 1431   BP: 148/90   BP Location: Left arm   Patient Position: Sitting   Cuff Size: Large Pulse: 58   Temp: 98 °F (36 7 °C)   TempSrc: Temporal   SpO2: 99%   Weight: 121 kg (266 lb 11 2 oz)   Height: 5' 8" (1 727 m)      Physical Exam  Constitutional:       Appearance: He is well-developed  HENT:      Head: Normocephalic and atraumatic  Eyes:      Conjunctiva/sclera: Conjunctivae normal    Neck:      Musculoskeletal: Neck supple  Thyroid: No thyromegaly  Cardiovascular:      Rate and Rhythm: Normal rate and regular rhythm  Heart sounds: Normal heart sounds  No murmur  Pulmonary:      Effort: Pulmonary effort is normal  No respiratory distress  Breath sounds: Normal breath sounds  Lymphadenopathy:      Cervical: No cervical adenopathy  Skin:     General: Skin is warm and dry  Psychiatric:         Behavior: Behavior normal            Patient's chronic problems that were reviewed today are stable  Recent hospital stays reviewed  Recent labs and imaging reviewed  Recent visits to other providers reviewed  Meds reviewed and no changes made  Appropriate labs and imaging were ordered  Preventive measures appropriate for age and sex were reviewed with patient  Immunizations were updated as appropriate

## 2021-02-09 NOTE — PATIENT INSTRUCTIONS
TO AVOID COVID (CORONAVIRUS) INFECTION THE FOLLOWING ARE HELPFUL:    1  Avoid areas where there are a lot of people  Examples would be small restaurants churches small grocery stores etc   Keep 6 feet between you  2  If you must go out around people use a mask  When around people use a hand  after touching surfaces  Important areas are grocery stores, gas pumps, pharmacies, [place], etc  Remember: masks protect the "other mary"  They are not a substitute for staying 6 feet away  3  KEEP YOUR HANDS AWAY FROM YOUR FACE  IT IS EXTREMELY DIFFICULT TO GET COVID INFECTION IF YOU DO NOT TOUCH YOUR FACE AND ARE NOT AROUND PEOPLE  3  If you wish to work in the yard or walk around the neighborhood or in a park and you are not around a lot of people it is okay to keep your mask in your pocket  Casually passing someone without a mask does not put you at risk  4  Try to avoid having people coming in and out of your home  This would include cleaning personnel delivery people unnecessary service people etc   after anyone comes into your home including family be careful to wipe all surfaces with a home   I strongly recommend that you consider getting the new vaccine when available  Must     Wait 90 days after the acute illness

## 2021-06-24 ENCOUNTER — TELEPHONE (OUTPATIENT)
Dept: FAMILY MEDICINE CLINIC | Facility: CLINIC | Age: 56
End: 2021-06-24

## 2021-08-27 ENCOUNTER — OFFICE VISIT (OUTPATIENT)
Dept: FAMILY MEDICINE CLINIC | Facility: CLINIC | Age: 56
End: 2021-08-27
Payer: COMMERCIAL

## 2021-08-27 VITALS
HEART RATE: 55 BPM | TEMPERATURE: 97.7 F | BODY MASS INDEX: 39.77 KG/M2 | DIASTOLIC BLOOD PRESSURE: 80 MMHG | SYSTOLIC BLOOD PRESSURE: 158 MMHG | HEIGHT: 68 IN | WEIGHT: 262.4 LBS | OXYGEN SATURATION: 99 %

## 2021-08-27 DIAGNOSIS — J45.909 UNCOMPLICATED ASTHMA, UNSPECIFIED ASTHMA SEVERITY, UNSPECIFIED WHETHER PERSISTENT: ICD-10-CM

## 2021-08-27 DIAGNOSIS — R73.9 HYPERGLYCEMIA: ICD-10-CM

## 2021-08-27 DIAGNOSIS — U07.1 COVID-19 VIRUS INFECTION: ICD-10-CM

## 2021-08-27 DIAGNOSIS — G47.30 SLEEP APNEA, UNSPECIFIED TYPE: ICD-10-CM

## 2021-08-27 DIAGNOSIS — I10 ESSENTIAL HYPERTENSION: ICD-10-CM

## 2021-08-27 DIAGNOSIS — Z23 ENCOUNTER FOR IMMUNIZATION: Primary | ICD-10-CM

## 2021-08-27 PROBLEM — J45.20 MILD INTERMITTENT ASTHMA: Chronic | Status: ACTIVE | Noted: 2021-08-27

## 2021-08-27 PROBLEM — L91.8 SKIN TAGS, MULTIPLE ACQUIRED: Status: RESOLVED | Noted: 2019-08-22 | Resolved: 2021-08-27

## 2021-08-27 LAB
ANION GAP SERPL CALCULATED.3IONS-SCNC: 2 MMOL/L (ref 4–13)
BUN SERPL-MCNC: 11 MG/DL (ref 5–25)
CALCIUM SERPL-MCNC: 8.8 MG/DL (ref 8.3–10.1)
CHLORIDE SERPL-SCNC: 107 MMOL/L (ref 100–108)
CHOLEST SERPL-MCNC: 135 MG/DL (ref 50–200)
CO2 SERPL-SCNC: 29 MMOL/L (ref 21–32)
CREAT SERPL-MCNC: 0.96 MG/DL (ref 0.6–1.3)
CREAT UR-MCNC: 291 MG/DL
EST. AVERAGE GLUCOSE BLD GHB EST-MCNC: 128 MG/DL
GFR SERPL CREATININE-BSD FRML MDRD: 101 ML/MIN/1.73SQ M
GLUCOSE SERPL-MCNC: 93 MG/DL (ref 65–140)
HBA1C MFR BLD: 6.1 %
HDLC SERPL-MCNC: 47 MG/DL
LDLC SERPL CALC-MCNC: 70 MG/DL (ref 0–100)
MICROALBUMIN UR-MCNC: 13.9 MG/L (ref 0–20)
MICROALBUMIN/CREAT 24H UR: 5 MG/G CREATININE (ref 0–30)
NONHDLC SERPL-MCNC: 88 MG/DL
POTASSIUM SERPL-SCNC: 3.9 MMOL/L (ref 3.5–5.3)
SODIUM SERPL-SCNC: 138 MMOL/L (ref 136–145)
TRIGL SERPL-MCNC: 91 MG/DL

## 2021-08-27 PROCEDURE — 3008F BODY MASS INDEX DOCD: CPT | Performed by: FAMILY MEDICINE

## 2021-08-27 PROCEDURE — 3725F SCREEN DEPRESSION PERFORMED: CPT | Performed by: FAMILY MEDICINE

## 2021-08-27 PROCEDURE — 83036 HEMOGLOBIN GLYCOSYLATED A1C: CPT | Performed by: FAMILY MEDICINE

## 2021-08-27 PROCEDURE — 36415 COLL VENOUS BLD VENIPUNCTURE: CPT | Performed by: FAMILY MEDICINE

## 2021-08-27 PROCEDURE — 82570 ASSAY OF URINE CREATININE: CPT | Performed by: FAMILY MEDICINE

## 2021-08-27 PROCEDURE — 82043 UR ALBUMIN QUANTITATIVE: CPT | Performed by: FAMILY MEDICINE

## 2021-08-27 PROCEDURE — 80061 LIPID PANEL: CPT | Performed by: FAMILY MEDICINE

## 2021-08-27 PROCEDURE — 99396 PREV VISIT EST AGE 40-64: CPT | Performed by: FAMILY MEDICINE

## 2021-08-27 PROCEDURE — 1036F TOBACCO NON-USER: CPT | Performed by: FAMILY MEDICINE

## 2021-08-27 PROCEDURE — 80048 BASIC METABOLIC PNL TOTAL CA: CPT | Performed by: FAMILY MEDICINE

## 2021-08-27 NOTE — PROGRESS NOTES
Assessment/Plan:    No problem-specific Assessment & Plan notes found for this encounter  Diagnoses and all orders for this visit:    Encounter for immunization  -     TDAP VACCINE GREATER THAN OR EQUAL TO 8YO IM    Sleep apnea, unspecified type    Essential hypertension    Hyperglycemia    COVID-19 virus infection          Subjective:      Patient ID: Mariluz Vaughan is a 60 y o  male  PATIENT RETURNS FOR FOLLOW-UP OF CHRONIC MEDICAL CONDITIONS  NO HOSPITAL STAYS OR EMERGENCY VISITS RECENTLY  MEDS WERE REVIEWED AND NO SIDE EFFECTS  NO NEW ISSUES  UNLESS NOTED BELOW  NO NEW MEDICAL PROVIDER REPORTED  THE CHRONIC DISEASES LISTED ABOVE ARE STABLE AND UNCHANGED/ THE PLAN OF CARE FOR THOSE WILL REMAIN UNCHANGED UNLESS NOTED BELOW  Wellness          The following portions of the patient's history were reviewed and updated as appropriate: allergies, current medications, past family history, past medical history, past social history, past surgical history and problem list     Review of Systems   Constitutional: Negative for activity change and appetite change  HENT: Negative for trouble swallowing  Eyes: Negative for visual disturbance  Respiratory: Negative for cough and shortness of breath  Cardiovascular: Negative for chest pain, palpitations and leg swelling  Gastrointestinal: Negative for abdominal pain and blood in stool  Endocrine: Negative for polyuria  Genitourinary: Negative for difficulty urinating and hematuria  Skin: Negative for rash  Neurological: Negative for dizziness  Psychiatric/Behavioral: Negative for behavioral problems  Objective:  Vitals:    08/27/21 1548   BP: 158/80   BP Location: Left arm   Patient Position: Sitting   Cuff Size: Large   Pulse: 55   Temp: 97 7 °F (36 5 °C)   TempSrc: Temporal   SpO2: 99%   Weight: 119 kg (262 lb 6 4 oz)   Height: 5' 8" (1 727 m)      Physical Exam  Constitutional:       Appearance: He is well-developed     HENT: Head: Normocephalic and atraumatic  Eyes:      Conjunctiva/sclera: Conjunctivae normal    Neck:      Thyroid: No thyromegaly  Cardiovascular:      Rate and Rhythm: Normal rate and regular rhythm  Heart sounds: Normal heart sounds  No murmur heard  Pulmonary:      Effort: Pulmonary effort is normal  No respiratory distress  Breath sounds: Normal breath sounds  Musculoskeletal:      Cervical back: Neck supple  Lymphadenopathy:      Cervical: No cervical adenopathy  Skin:     General: Skin is warm and dry  Psychiatric:         Behavior: Behavior normal            Patient's chronic problems that were reviewed today are stable  Recent hospital stays reviewed  Recent labs and imaging reviewed  Recent visits to other providers reviewed  Meds reviewed and no changes made  Appropriate labs and imaging were ordered  Preventive measures appropriate for age and sex were reviewed with patient  Immunizations were updated as appropriate

## 2021-08-27 NOTE — PATIENT INSTRUCTIONS
TO AVOID COVID (CORONAVIRUS) INFECTION  (including the Delta Variant) THE FOLLOWING ARE HELPFUL:    1  Avoid areas where there are a lot of people  Examples would be small restaurants and churches, small grocery stores etc   Keep 6 feet between you  2  If you must go indoors around people use a mask  Even fully immunized people can get Covid but usually much less risk and less severe  Most people (>95%) in hospital with Covid today are NON- IMMUNIZED  I strongly recommend that you consider getting the Covid vaccine  It is effective against Delta as well as original strains  Patients age 15 and above are now eligible  Current information indicates that it is quite safe and extremely effective  The risk of dying from [place] after being fully immunized is less that one in a million  The risk of serious side effects from the vaccines is extremely low  The risk from getting Covid far outweighs the risk of serious side effects from the vaccine  The vaccine is available at most pharmacies and the [address] [place] [address]  "vaccines  gov" is a good website to check for vaccine availability  Call [address] [place] [address] to register for the vaccine : [phone] : option 7 ; Note that the [place] [employer] vaccine has now been FULLY APPROVED by FDA  No longer Emergency Use status  Likely the Moderna and J+J vaccine will receive full approval soon as well  Booster shots will likely be available in several weeks for everyone who received the initial shots  I strongly encourage a booster when you qualify  Currently only  "immune-compromised" patients are approved for the booster vaccine: This includes patients with organ transplants, those receiving chemo therapy for cancer, and those who are severely immune deficient  Boosters will be available at the above sites       Try to do the stool test in the next few weeks

## 2021-08-27 NOTE — PROGRESS NOTES
BMI Counseling: Body mass index is 39 9 kg/m²  The BMI is above normal  Nutrition recommendations include reducing portion sizes, decreasing overall calorie intake, 3-5 servings of fruits/vegetables daily, reducing fast food intake, consuming healthier snacks, decreasing soda and/or juice intake, moderation in carbohydrate intake, increasing intake of lean protein, reducing intake of saturated fat and trans fat and reducing intake of cholesterol  Exercise recommendations include moderate aerobic physical activity for 150 minutes/week, exercising 3-5 times per week and joining a gym

## 2021-10-27 DIAGNOSIS — I10 ESSENTIAL HYPERTENSION: ICD-10-CM

## 2021-10-27 RX ORDER — LISINOPRIL 20 MG/1
TABLET ORAL
Qty: 180 TABLET | Refills: 0 | Status: SHIPPED | OUTPATIENT
Start: 2021-10-27 | End: 2022-03-28 | Stop reason: SDUPTHER

## 2022-03-28 ENCOUNTER — OFFICE VISIT (OUTPATIENT)
Dept: FAMILY MEDICINE CLINIC | Facility: CLINIC | Age: 57
End: 2022-03-28
Payer: COMMERCIAL

## 2022-03-28 VITALS
HEART RATE: 61 BPM | OXYGEN SATURATION: 97 % | HEIGHT: 68 IN | SYSTOLIC BLOOD PRESSURE: 154 MMHG | BODY MASS INDEX: 40.38 KG/M2 | DIASTOLIC BLOOD PRESSURE: 90 MMHG | WEIGHT: 266.4 LBS | TEMPERATURE: 97.7 F | RESPIRATION RATE: 20 BRPM

## 2022-03-28 DIAGNOSIS — J45.20 MILD INTERMITTENT ASTHMA WITHOUT COMPLICATION: Chronic | ICD-10-CM

## 2022-03-28 DIAGNOSIS — I10 PRIMARY HYPERTENSION: ICD-10-CM

## 2022-03-28 DIAGNOSIS — I10 ESSENTIAL HYPERTENSION: ICD-10-CM

## 2022-03-28 DIAGNOSIS — U07.1 COVID-19 VIRUS INFECTION: Primary | ICD-10-CM

## 2022-03-28 DIAGNOSIS — R73.9 HYPERGLYCEMIA: ICD-10-CM

## 2022-03-28 LAB — SL AMB POCT HEMOGLOBIN AIC: 6.4 (ref ?–6.5)

## 2022-03-28 PROCEDURE — 83036 HEMOGLOBIN GLYCOSYLATED A1C: CPT | Performed by: FAMILY MEDICINE

## 2022-03-28 PROCEDURE — 99214 OFFICE O/P EST MOD 30 MIN: CPT | Performed by: FAMILY MEDICINE

## 2022-03-28 RX ORDER — LISINOPRIL 20 MG/1
40 TABLET ORAL DAILY
Qty: 180 TABLET | Refills: 6 | Status: SHIPPED | OUTPATIENT
Start: 2022-03-28

## 2022-03-28 NOTE — PATIENT INSTRUCTIONS
PROPER BLOOD PRESSURE MEASUREMENTS INCLUDE:     SIT IN A STRAIGHT BACK CHAIR WITH YOUR FEET ON THE FLOOR   SIT AT LEAST 5 MINUTES BEFORE CHECKING BP   USE A CUFF THAT IS ABOVE THE ELBOW AND AUTOMATIC   "[employer]" IS A GOOD BRAND : [facility]/ [facility] HAVE THEM    RECORD BP 3-4 TIMES PER WEEK AND BRING TO NEXT OFFICE VISIT   DO HOME BP MEASUREMENTS AT LEAST 2 HRS AFTER ANY CAFFEINE DRINK    Please get a large adult cuff  Target for your blood pressure is 145/90 or better most days  THESE THINGS HELP YOU LOSE WEIGHT:    REDUCE PORTIONS  DRINK WATER CONSTANTLY THROUGHOUT YOUR MEALS  ELIMINATE SWEET DRINKS  WEIGH DAILY AND KEEP A "VISUAL" CHART OF PROGRESS  REDUCE CARBOHYDRATES: PASTA/BREADS/BAGELS/DONUTS/ RICE ETC  DO SOME WALKING OR EXERCISE EVERY DAY FOR 20-30 MINUTES  AVOID THESE HIGH SALT FOODS:    SNACKS THAT TASTE SALTY: PRETZELS/CHIPSPOPCORN  CANNED SOUPS AND VEGGIES  FROZEN FOODS/ MICROWAVEABLE FOODS/ CANNED FOODS    AVOID ADDING EXTRA SALT TO THE MEALS

## 2022-03-28 NOTE — PROGRESS NOTES
Assessment/Plan:    No problem-specific Assessment & Plan notes found for this encounter  Diagnoses and all orders for this visit:    COVID-19 virus infection    Mild intermittent asthma without complication    Primary hypertension    Essential hypertension  -     lisinopril (ZESTRIL) 20 mg tablet; Take 2 tablets (40 mg total) by mouth daily          Subjective:      Patient ID: Avis Bro is a 60 y o  male  Had 2nd bout Covid :     Refusing immunizations       The following portions of the patient's history were reviewed and updated as appropriate: allergies, current medications, past family history, past medical history, past social history, past surgical history and problem list     Review of Systems   Constitutional: Negative for activity change and appetite change  HENT: Negative for trouble swallowing  Eyes: Negative for visual disturbance  Respiratory: Negative for cough and shortness of breath  Cardiovascular: Negative for chest pain, palpitations and leg swelling  Gastrointestinal: Negative for abdominal pain and blood in stool  Endocrine: Negative for polyuria  Genitourinary: Negative for difficulty urinating and hematuria  Skin: Negative for rash  Neurological: Negative for dizziness  Psychiatric/Behavioral: Negative for behavioral problems  Objective:  Vitals:    03/28/22 1059   BP: 154/90   BP Location: Left arm   Patient Position: Sitting   Cuff Size: Large   Pulse: 61   Resp: 20   Temp: 97 7 °F (36 5 °C)   TempSrc: Tympanic   SpO2: 97%   Weight: 121 kg (266 lb 6 4 oz)   Height: 5' 7 5" (1 715 m)      Physical Exam  Constitutional:       Appearance: He is well-developed  HENT:      Head: Normocephalic and atraumatic  Eyes:      Conjunctiva/sclera: Conjunctivae normal    Neck:      Thyroid: No thyromegaly  Cardiovascular:      Rate and Rhythm: Normal rate and regular rhythm  Heart sounds: Normal heart sounds  No murmur heard        Pulmonary: Effort: Pulmonary effort is normal  No respiratory distress  Breath sounds: Normal breath sounds  Musculoskeletal:      Cervical back: Neck supple  Lymphadenopathy:      Cervical: No cervical adenopathy  Skin:     General: Skin is warm and dry  Psychiatric:         Behavior: Behavior normal            Patient's chronic problems that were reviewed today are stable  Recent hospital stays reviewed  Recent labs and imaging reviewed  Recent visits to other providers reviewed  Meds reviewed and no changes made  Appropriate labs and imaging were ordered  Preventive measures appropriate for age and sex were reviewed with patient  Immunizations were updated as appropriate

## 2022-10-17 ENCOUNTER — OFFICE VISIT (OUTPATIENT)
Dept: FAMILY MEDICINE CLINIC | Facility: CLINIC | Age: 57
End: 2022-10-17
Payer: COMMERCIAL

## 2022-10-17 VITALS
WEIGHT: 267.8 LBS | TEMPERATURE: 97.2 F | DIASTOLIC BLOOD PRESSURE: 82 MMHG | OXYGEN SATURATION: 98 % | HEART RATE: 85 BPM | SYSTOLIC BLOOD PRESSURE: 144 MMHG | BODY MASS INDEX: 41.32 KG/M2

## 2022-10-17 DIAGNOSIS — M70.62 TROCHANTERIC BURSITIS OF LEFT HIP: Primary | ICD-10-CM

## 2022-10-17 DIAGNOSIS — R59.1 LYMPHADENOPATHY: ICD-10-CM

## 2022-10-17 PROCEDURE — 99214 OFFICE O/P EST MOD 30 MIN: CPT | Performed by: NURSE PRACTITIONER

## 2022-10-17 RX ORDER — NAPROXEN 500 MG/1
500 TABLET ORAL 2 TIMES DAILY WITH MEALS
Qty: 30 TABLET | Refills: 0 | Status: SHIPPED | OUTPATIENT
Start: 2022-10-17

## 2022-10-17 RX ORDER — ERYTHROMYCIN 20 MG/ML
SWAB TOPICAL
COMMUNITY
Start: 2022-07-18

## 2022-10-17 RX ORDER — FLUTICASONE PROPIONATE 50 MCG
SPRAY, SUSPENSION (ML) NASAL
COMMUNITY
Start: 2022-09-30

## 2022-10-17 RX ORDER — DOXYCYCLINE HYCLATE 100 MG/1
100 CAPSULE ORAL EVERY 12 HOURS SCHEDULED
Qty: 14 CAPSULE | Refills: 0 | Status: SHIPPED | OUTPATIENT
Start: 2022-10-17 | End: 2022-10-24

## 2022-10-17 NOTE — PATIENT INSTRUCTIONS
Hip Bursitis   AMBULATORY CARE:   Hip bursitis  is inflammation of the bursa in your hip  The bursa is a fluid-filled sac that acts as a cushion between a bone and a tendon  A tendon is a cord of strong tissue that connects muscles to bones  Common signs and symptoms of hip bursitis:   Pain on the side of your hip or at the base of your hips when you sit down    Decreased movement or stiffness of your hip    Crunching or popping when you move your hip    Redness or swelling of the skin on your hip    Call your doctor if:   Your pain and swelling increase  Your symptoms do not improve with treatment  You have a fever  You have questions or concerns about your condition or care  Treatment  may include any of the following:  Medicines:      NSAIDs , such as ibuprofen, help decrease swelling, pain, and fever  NSAIDs can cause stomach bleeding or kidney problems in certain people  If you take blood thinner medicine, always ask your healthcare provider if NSAIDs are safe for you  Always read the medicine label and follow directions  Aspirin  helps relieve pain and swelling  Take aspirin exactly as directed by your healthcare provider  Antibiotics  help fight an infection caused by bacteria  Steroids  help relieve pain and swelling  Steroid injections are given directly into the painful area  Steroid pills may be given for a short time  Surgery  may be needed to remove your bursa or part of your elbow bone  Surgery is only done when other treatments do not work  Manage hip bursitis:   Rest your hip as much as possible to decrease pain and swelling  Slowly start to do more each day  Return to your daily activities as directed  Apply ice to help decrease swelling and pain  Use an ice pack, or put crushed ice in a plastic bag  Cover the bag with a towel before you place it on your elbow  Apply ice for 15 to 20 minutes, 3 to 4 times each day, as directed  Do not lie on your injured hip  You may be more comfortable if you sleep on your stomach or back  Go to physical therapy, if directed  A physical therapist teaches you exercises to help improve movement and strength, and to decrease pain  Prevent hip bursitis:   Do not overuse your hips  Shorten the time you spend running, climbing stairs, or riding a bike  Take breaks as you do these activities  Try not to do the same activities each day  For example, run every other day or every 3 days instead of daily  Stretch, warm up, and cool down  Always stretch and do warmup and cool-down exercises before and after you exercise  This will help loosen your muscles and decrease stress on your hip  Rest between workouts  Wear proper shoes  Wear shoes that fit properly and support your feet  You may need to wear shoe inserts called orthotics  Orthotics help position your foot correctly as you walk or exercise  Maintain a healthy weight  Ask your healthcare provider what a healthy weight is for you  Ask him or her to help you create a weight loss plan if you are overweight  Keep pressure off your hips  Do not stand or sit for long periods of time  Sit on padded surfaces, such as a cushion or pad, whenever possible  Bend your knees when you  objects from the ground  Follow up with your doctor as directed:  Write down your questions so you remember to ask them during your visits  © Copyright [employer] 2022 Information is for End User's use only and may not be sold, redistributed or otherwise used for commercial purposes  All illustrations and images included in CareNotes® are the copyrighted property of [employer]  or [employer]   The above information is an  only  It is not intended as medical advice for individual conditions or treatments  Talk to your doctor, nurse or pharmacist before following any medical regimen to see if it is safe and effective for you

## 2022-10-17 NOTE — LETTER
October 17, 2022     Patient: Carolyn Champion  YOB: 1965  Date of Visit: 10/17/2022      To Whom it May Concern:    Carolyn Champion is under my professional care  Juan Manuel Jordan was seen in my office on 10/17/2022  Juan Manuel Jordan may return to work on 10/21/2022  If you have any questions or concerns, please don't hesitate to call           Sincerely,          YENI Barrera        CC:   No Recipients

## 2022-10-17 NOTE — PROGRESS NOTES
Subjective:   Chief Complaint   Patient presents with   • Hip Pain     Left hip pain for a couple weeks  • Sore Throat     Swollen glands         Patient ID: Joy Contreras is a 59 y o  male  Presents with left hip pain that started 10/07/22  Hurts walking and standing  Applied ice and has been taking ibuprofen and naproxen with no relief  Some relief with rest  Patient complained of swollen lymph nodes that were tender and apparent since 10/15/22  The following portions of the patient's history were reviewed and updated as appropriate: allergies, current medications, past family history, past medical history, past social history, past surgical history and problem list     Review of Systems   Constitutional: Negative for chills, fatigue and fever  Respiratory: Negative for cough and shortness of breath  Psychiatric/Behavioral: Negative for dysphoric mood  The patient is not nervous/anxious  Objective:  Vitals:    10/17/22 1113   BP: 144/82   BP Location: Left arm   Patient Position: Sitting   Cuff Size: Adult   Pulse: 85   Temp: (!) 97 2 °F (36 2 °C)   TempSrc: Temporal   SpO2: 98%   Weight: 121 kg (267 lb 12 8 oz)      Physical Exam  Constitutional:       Appearance: He is obese  HENT:      Mouth/Throat:      Mouth: Mucous membranes are moist       Pharynx: Oropharynx is clear  Cardiovascular:      Rate and Rhythm: Normal rate and regular rhythm  Chest:   Breasts:      Right: No axillary adenopathy  Left: No axillary adenopathy  Musculoskeletal:      Right hip: Normal       Left hip: Tenderness present  Normal range of motion  Normal strength  Lymphadenopathy:      Head:      Right side of head: Submandibular adenopathy present  No submental, tonsillar, preauricular, posterior auricular or occipital adenopathy  Left side of head: Submandibular adenopathy present  No submental, tonsillar, preauricular, posterior auricular or occipital adenopathy  Cervical: No cervical adenopathy  Upper Body:      Right upper body: No axillary adenopathy  Left upper body: No axillary adenopathy  Lower Body: No right inguinal adenopathy  No left inguinal adenopathy  Skin:     General: Skin is warm and dry  Capillary Refill: Capillary refill takes less than 2 seconds  Neurological:      Mental Status: He is alert and oriented to person, place, and time  Psychiatric:         Mood and Affect: Mood normal          Behavior: Behavior normal            Assessment/Plan:    No problem-specific Assessment & Plan notes found for this encounter  Diagnoses and all orders for this visit:    Trochanteric bursitis of left hip  -     Ambulatory referral to Physical Therapy; Future  -     naproxen (NAPROSYN) 500 mg tablet; Take 1 tablet (500 mg total) by mouth 2 (two) times a day with meals    Lymphadenopathy  -     doxycycline hyclate (VIBRAMYCIN) 100 mg capsule;  Take 1 capsule (100 mg total) by mouth every 12 (twelve) hours for 7 days    Other orders  -     Jeff 2 % PADS; TO BE APPLY TWICE A DAY BETWEEN TOES (Patient not taking: Reported on 10/17/2022)  -     fluticasone (FLONASE) 50 mcg/act nasal spray

## 2022-10-19 ENCOUNTER — TRANSCRIBE ORDERS (OUTPATIENT)
Dept: FAMILY MEDICINE CLINIC | Facility: CLINIC | Age: 57
End: 2022-10-19

## 2022-10-19 DIAGNOSIS — M70.62 TROCHANTERIC BURSITIS OF LEFT HIP: Primary | ICD-10-CM

## 2022-10-21 ENCOUNTER — EVALUATION (OUTPATIENT)
Dept: PHYSICAL THERAPY | Facility: MEDICAL CENTER | Age: 57
End: 2022-10-21
Payer: COMMERCIAL

## 2022-10-21 DIAGNOSIS — M70.62 TROCHANTERIC BURSITIS OF LEFT HIP: Primary | ICD-10-CM

## 2022-10-21 PROCEDURE — 97161 PT EVAL LOW COMPLEX 20 MIN: CPT | Performed by: PHYSICAL THERAPIST

## 2022-10-21 PROCEDURE — 97140 MANUAL THERAPY 1/> REGIONS: CPT | Performed by: PHYSICAL THERAPIST

## 2022-10-21 PROCEDURE — 97110 THERAPEUTIC EXERCISES: CPT | Performed by: PHYSICAL THERAPIST

## 2022-10-26 ENCOUNTER — OFFICE VISIT (OUTPATIENT)
Dept: FAMILY MEDICINE CLINIC | Facility: CLINIC | Age: 57
End: 2022-10-26

## 2022-10-26 VITALS
OXYGEN SATURATION: 99 % | WEIGHT: 268 LBS | HEART RATE: 61 BPM | HEIGHT: 68 IN | DIASTOLIC BLOOD PRESSURE: 80 MMHG | RESPIRATION RATE: 18 BRPM | TEMPERATURE: 98.3 F | SYSTOLIC BLOOD PRESSURE: 138 MMHG | BODY MASS INDEX: 40.62 KG/M2

## 2022-10-26 DIAGNOSIS — M25.552 HIP PAIN, ACUTE, LEFT: Primary | ICD-10-CM

## 2022-10-26 DIAGNOSIS — R59.0 CERVICAL ADENOPATHY: ICD-10-CM

## 2022-10-26 RX ORDER — CYCLOBENZAPRINE HCL 5 MG
5 TABLET ORAL 3 TIMES DAILY PRN
Qty: 30 TABLET | Refills: 0 | Status: SHIPPED | OUTPATIENT
Start: 2022-10-26

## 2022-10-26 NOTE — ASSESSMENT & PLAN NOTE
Slightly improving  ·   Less likely trochanteric bursitis as pain can be located in various locations including lateral thigh and left buttock  · Continue PT  · Start muscle relaxer Flexeril 5 mg t i d  p r n    · Continue naproxen 500mg BID with food  · Patient may continue to work  ·  follow-up in 1 week if pain is worsening or fails to improve

## 2022-10-26 NOTE — PROGRESS NOTES
Assessment/Plan:      1  Hip pain, acute, left  Assessment & Plan:  Slightly improving    Less likely trochanteric bursitis as pain can be located in various locations including lateral thigh and left buttock  Continue PT  Start muscle relaxer Flexeril 5 mg t i d  p r n  Continue naproxen 500mg BID with food  Patient may continue to work   follow-up in 1 week if pain is worsening or fails to improve    Orders:  -     cyclobenzaprine (FLEXERIL) 5 mg tablet; Take 1 tablet (5 mg total) by mouth 3 (three) times a day as needed for muscle spasms    2  Cervical adenopathy  Comments:   resolving s/p doxycycline            Subjective:      Patient ID: Charles Guillen is a 59 y o  male presents today for follow up for left hip and leg pain  Since going to pt and starting naproxen he feels improvement  The pain is worse with walking long distances  HPI    The following portions of the patient's history were reviewed and updated as appropriate: allergies, current medications, past family history, past medical history, past social history, past surgical history and problem list     Review of Systems   Constitutional: Negative for activity change, chills, diaphoresis and fever  HENT: Negative for ear pain, hearing loss, postnasal drip, rhinorrhea, sinus pressure, sinus pain, sneezing and sore throat  Respiratory: Negative for cough, chest tightness, shortness of breath and wheezing  Cardiovascular: Negative for chest pain, palpitations and leg swelling  Gastrointestinal: Negative for abdominal pain, blood in stool, constipation, diarrhea, nausea and vomiting  Genitourinary: Negative for dysuria, frequency, hematuria and urgency  Musculoskeletal: Negative for arthralgias and myalgias  Neurological: Negative for dizziness, syncope, weakness, light-headedness, numbness and headaches           Objective:      /80 (BP Location: Left arm, Patient Position: Sitting, Cuff Size: Large)   Pulse 61   Temp 98 3 °F (36 8 °C) (Temporal)   Resp 18   Ht 5' 7 5" (1 715 m)   Wt 122 kg (268 lb)   SpO2 99%   BMI 41 36 kg/m²          Physical Exam  Vitals reviewed  Constitutional:       General: He is not in acute distress  Appearance: He is well-developed  He is not diaphoretic  HENT:      Head: Normocephalic and atraumatic  Right Ear: External ear normal       Left Ear: External ear normal       Nose: Nose normal  No congestion  Mouth/Throat:      Mouth: Mucous membranes are moist       Pharynx: Oropharynx is clear  No oropharyngeal exudate  Eyes:      General: No scleral icterus  Pupils: Pupils are equal, round, and reactive to light  Neck:      Thyroid: No thyromegaly  Vascular: No JVD  Trachea: No tracheal deviation  Cardiovascular:      Rate and Rhythm: Normal rate and regular rhythm  Pulses: Normal pulses  Heart sounds: Normal heart sounds  No murmur heard  No friction rub  Pulmonary:      Effort: Pulmonary effort is normal  No respiratory distress  Breath sounds: Normal breath sounds  No wheezing or rales  Chest:      Chest wall: No tenderness  Abdominal:      General: Bowel sounds are normal  There is no distension  Palpations: Abdomen is soft  Tenderness: There is no abdominal tenderness  There is no right CVA tenderness, left CVA tenderness, guarding or rebound  Musculoskeletal:         General: Normal range of motion  Cervical back: Normal range of motion and neck supple  No tenderness  Left hip: No tenderness, bony tenderness or crepitus  Normal range of motion  Normal strength  Left upper leg: Tenderness ( spasms to lateral thigh) present  Right lower leg: No edema  Left lower leg: No edema  Lymphadenopathy:      Cervical: No cervical adenopathy  Skin:     General: Skin is warm and dry  Neurological:      Mental Status: He is alert and oriented to person, place, and time  Mental status is at baseline  Deep Tendon Reflexes: Reflexes are normal and symmetric  Psychiatric:         Mood and Affect: Mood normal          Behavior: Behavior normal          Thought Content:  Thought content normal          Judgment: Judgment normal

## 2022-10-28 ENCOUNTER — OFFICE VISIT (OUTPATIENT)
Dept: PHYSICAL THERAPY | Facility: MEDICAL CENTER | Age: 57
End: 2022-10-28

## 2022-10-28 DIAGNOSIS — M70.62 TROCHANTERIC BURSITIS OF LEFT HIP: Primary | ICD-10-CM

## 2022-10-28 PROCEDURE — 97140 MANUAL THERAPY 1/> REGIONS: CPT | Performed by: PHYSICAL THERAPIST

## 2022-10-28 NOTE — PROGRESS NOTES
Daily Note     Today's date: 10/28/2022  Patient name: Naomie Parsons  : 1965  MRN: [id number]  Referring provider: Marylene Sexton, CRNP  Dx:   Encounter Diagnosis     ICD-10-CM    1  Trochanteric bursitis of left hip  M70 [id number]                   Subjective: Patient reports that he felt good after last session with less pain in the outside of his L hip  He returned to work earlier this week, and he reports that he developed a lot of pain in the top of his hip radiating to just below his groin after standing and walking for many hours  Objective: See treatment diary below      Assessment: Patient presented with an improvement in L hip IR PROM today in prone compared to last session  Continued with L hip inferolateral glides to improve end-range L hip IR PROM with patient demonstrating L hip IR PROM WFL that was comparable to the contralateral side post-tx  Performed lumbar PA mobilizations to address lumbar hypomobility and decrease compensatory strain on hip girdle when ambulating during job duties  Also continued with STM to L glutes/HS/ITB to address soft tissue restrictions due to positive response after this intervention at prior session  Patient reported alleviation of symptoms after manuals  Patient was educated to add [facility] to HEP to improve lumbar mobility for standing and ambulating  Patient was also educated to perform stretching prior to standing and walking for prolonged periods at work  Patient tolerated treatment well  Patient would benefit from continued PT to address impairments to maximize function  Plan: Continue per plan of care  Precautions: HTN, hx of retina surgery      Manuals 10/21 10/28           L hip inferolateral glides KP Gr  III KP Gr  II           STM (theragun) to L glutes/ITB/HS KP KP           Lumbar PAs  KP Gr   III           Total x15' x15'                        Neuro Re-Ed Ther Ex             Supine glut sets 5"x30 HEP HEP           Supine strap ITB stretch 20"x3 HEP HEP           LTR NV? Supine resisted hip ABD NV?             SKTC NV? 10"x10            Seated piriformis stretch 20"x3 HEP HEP           HEP education and instruction x10'                                                                Ther Activity                                       Gait Training                                       Modalities

## 2022-11-04 ENCOUNTER — OFFICE VISIT (OUTPATIENT)
Dept: PHYSICAL THERAPY | Facility: MEDICAL CENTER | Age: 57
End: 2022-11-04

## 2022-11-04 DIAGNOSIS — M70.62 TROCHANTERIC BURSITIS OF LEFT HIP: Primary | ICD-10-CM

## 2022-11-04 NOTE — PROGRESS NOTES
Daily Note     Today's date: 2022  Patient name: Viri Dumont  : 1965  MRN: [id number]  Referring provider: YENI Montesinos  Dx:   Encounter Diagnosis     ICD-10-CM    1  Trochanteric bursitis of left hip  M70 62                   Subjective: Patient reports that he continues to have pain in the outside of his L hip that also radiates into his L groin/anterior thigh/knee after standing and working for 10 hours  He notes that his pain is less intense compared to previously, and he feels relief after each PT session  Objective: See treatment diary below      Assessment: Patient presented with very mild limitation in end-range L hip IR PROM compared to the contralateral side that improved to be [facility] after hip mobilizations  Also continued with lumbar PA mobs to address lumbar hypomobility with patient demonstrating improved joint play today compared to last session  Patient responded well to manuals with report of decreased L hip pain when ambulating post-tx  Patient was educated to add supine resisted hip ABD to HEP to improve hip girdle stability for standing and ambulating  He reported mild L HS cramping during supine clams that resolved upon ambulation  Patient tolerated treatment well and reported alleviation of symptoms at the end of session  Patient would benefit from continued PT to improve lumbopelvic mobility and core/hip girdle strength to be able to perform job duties without recurrence of symptoms  Plan: Continue per plan of care  Precautions: HTN, hx of retina surgery      Manuals 10/21 10/28 11/4          L hip inferolateral glides KP Gr  III KP Gr  II KP Gr  III          STM (theragun) to L glutes/ITB/HS KP KP KP          Lumbar PAs  KP Gr  III KP Gr   III          Total x15' x15' x15'                       Neuro Re-Ed                                                                                                        Ther Ex             Supine glut sets 5"x30 HEP HEP HEP Supine strap ITB stretch 20"x3 HEP HEP HEP          LTR NV?             Supine resisted hip ABD NV?  3"x20 RTB HEP          SKTC NV? 10"x10  HEP          Seated piriformis stretch 20"x3 HEP HEP HEP          HEP education and instruction x10'                                                                Ther Activity                                       Gait Training                                       Modalities

## 2022-11-11 ENCOUNTER — OFFICE VISIT (OUTPATIENT)
Dept: PHYSICAL THERAPY | Facility: MEDICAL CENTER | Age: 57
End: 2022-11-11

## 2022-11-11 DIAGNOSIS — M70.62 TROCHANTERIC BURSITIS OF LEFT HIP: Primary | ICD-10-CM

## 2022-11-11 NOTE — PROGRESS NOTES
Daily Note     Today's date: 2022  Patient name: Jonny Neumann  : 1965  MRN: [id number]  Referring provider: YENI Garduno  Dx:   Encounter Diagnosis     ICD-10-CM    1  Trochanteric bursitis of left hip  M70 62                   Subjective: Patient reports that his L hip and lower leg are feeling better overall with less pain  He reports that he had some tightness and discomfort after working a long day a few days ago  He notes that this pain was not as intense as previously when he was working  Objective: See treatment diary below      Assessment: Continued with manual interventions to address lumbopelvic hypomobility and soft tissue restrictions throughout L hip girdle/proximal thigh  Patient demonstrated improved lumbar PA mobility today compared to prior sessions  L hip IR PROM [facility] after mobilizations  Patient was given tband of increased resistance for hip girdle strengthening during HEP performance  Patient is demonstrating good progress toward his goals and is independent with his home program for continued mobility and strengthening  Due to good progress, patient is agreeable to holding on formal PT at this time to work on his HEP  Patient was encouraged to follow-up in the future if symptoms return and/or worsen  Plan: Transition to HEP due to good overall progress  Will follow-up in the future if symptoms return and/or worsen  Precautions: HTN, hx of retina surgery      Manuals 10/21 10/28 11/4 11/11         L hip inferolateral glides KP Gr  III KP Gr  II KP Gr  III KP Gr  III         STM (theragun) to L glutes/ITB/HS KP KP KP KP         Lumbar PAs  KP Gr  III KP Gr  III KP Gr   III         Total x15' x15' x15' x15'                      Neuro Re-Ed                                                                                                        Ther Ex             Supine glut sets 5"x30 HEP HEP HEP HEP         Supine strap ITB stretch 20"x3 HEP HEP HEP HEP review LTR NV?             Supine resisted hip ABD NV?  3"x20 RTB HEP HEP review- green         SKTC NV? 10"x10  HEP HEP         Seated piriformis stretch 20"x3 HEP HEP HEP HEP         HEP education and instruction x10'                                                                Ther Activity                                       Gait Training                                       Modalities

## 2023-02-07 DIAGNOSIS — J45.909 UNCOMPLICATED ASTHMA, UNSPECIFIED ASTHMA SEVERITY, UNSPECIFIED WHETHER PERSISTENT: ICD-10-CM

## 2023-04-06 DIAGNOSIS — I10 ESSENTIAL HYPERTENSION: ICD-10-CM

## 2023-04-07 RX ORDER — LISINOPRIL 20 MG/1
TABLET ORAL
Qty: 180 TABLET | Refills: 0 | Status: SHIPPED | OUTPATIENT
Start: 2023-04-07

## 2023-06-06 NOTE — PROGRESS NOTES
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  No further referral is necessary at this time based upon examination results  Primary movement impairment is L hip IR hypomobility, which contributes to soft tissue tightness in his L glutes/piriformis/TFL and causes compensatory stress on L greater trochanter when ambulating longer distances  Lumbar EXT hypomobility further contributes to stress on L lateral hip when performing job duties  Patient also presents with L hip girdle weakness, which causes excessive strain on L lateral hip and limits his ability to perform his exercise routine  Patient responded well to manual interventions today with improved L hip IR PROM post-tx along with report of decreased L lateral hip pain when ambulating at the end of session  Patient was also educated in a gentle illustrated HEP for hip girdle stability and flexibility and was able to complete exercises without pain  In addition, patient was educated that he may perform gentle elliptical and stationary biking in the gym with no resistance and slow speed as long as he remains pain-free  Patient was educated to rest from gym workouts if he develops pain  He verbalized understanding  Patient would benefit from skilled PT services to address the listed impairments to facilitate a return to PLOF   Thank you for the referral   Impairments: abnormal muscle firing, abnormal muscle tone, abnormal or restricted ROM, abnormal movement, activity intolerance, impaired balance, impaired physical strength, lacks appropriate home exercise program and pain with function  Functional limitations: walking, exercise routine  Symptom irritability: moderateBarriers to therapy: none  Understanding of Dx/Px/POC: good   Prognosis: good  Prognosis details: Positive attitude towards recovery  Negative prognostic factors include HTN  Goals  STG:  Patient will be independent with home exercise program    Patient will increase L hip IR PROM to improve mechanics for ambulation  LTG:  Patient will increase lumbar EXT AROM to decrease compensatory strain on L lateral hip when standing  Patient will increase L hip strength to at least 4/5 in all planes to be able to ambulate longer distances  Patient will return to work with modifications as necessary  Patient will return to elliptical and gym routine  Patient will be able to manage symptoms independently  Plan  Plan details: Prognosis is above given PT services 1x/week for the next month and given HEP adherence  Patient would benefit from: skilled physical therapy  Referral necessary: No  Planned modality interventions: cryotherapy and thermotherapy: hydrocollator packs  Planned therapy interventions: activity modification, body mechanics training, flexibility, functional ROM exercises, graded exercise, graded activity, home exercise program, joint mobilization, manual therapy, massage, Ma taping, neuromuscular re-education, patient education, strengthening, stretching, therapeutic activities and therapeutic exercise  Frequency: 1x week  Duration in weeks: 4  Treatment plan discussed with: patient        Subjective Evaluation    History of Present Illness  Date of onset: 10/7/2022  Mechanism of injury: This is a 58 yo male presenting with L lateral hip pain that started 2 weeks ago after sitting in a chair for a long period of time   He reports that he stood up and then developed pain in the outside of his L hip  He notes that the pain worsened 3 days later when returning to work  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  He has not been working this past week due to his hip pain, but he plans to return this upcoming Monday (10/24)  He also is very active and enjoys exercising, especially on the elliptical  He desires to return to his elliptical routine and to be able to walk longer distances for working  Not a recurrent problem   Quality of life: good    Pain  Current pain ratin  At best pain ratin  At worst pain ratin  Location: L lateral hip, occasional radiation to L buttock  Quality: sharp  Relieving factors: rest and medications (Naproxen)  Aggravating factors: walking  Progression: worsening    Social Support    Employment status: working (works at a distribution center)    Diagnostic Tests  No diagnostic tests performed  Treatments  No previous or current treatments  Patient Goals  Patient goals for therapy: decreased pain, increased strength, return to sport/leisure activities and return to work  Patient goal: to be able to walk longer distances for work, to return to elliptical and exercise routine        Objective     Tenderness     Left Hip   Tenderness in the greater trochanter       Neurological Testing     Sensation     Lumbar   Left   Intact: light touch    Right   Intact: light touch    Reflexes   Left   Patellar (L4): absent (0)  Achilles (S1): absent (0)    Right   Patellar (L4): absent (0)  Achilles (S1): absent (0)    Active Range of Motion     Lumbar   Flexion:  WFL  Extension: Active lumbar extension: pain in L lateral hip   with pain Restriction level: moderate  Left rotation: Active left lumbar rotation: pain in L lateral hip   with pain Restriction level: moderate  Right rotation:  Restriction level: moderate  Left Hip   External rotation (90/90): 45 degrees Internal rotation (90/90): 15 degrees with pain    Right Hip   External rotation (90/90): 45 degrees   Internal rotation (90/90): 25 degrees     Passive Range of Motion   Left Hip   External rotation (90/90): 45 degrees   External rotation (prone): 45 degrees   Internal rotation (90/90): 25 degrees   Internal rotation (prone): 20 degrees     Right Hip   External rotation (90/90): 45 degrees   External rotation (prone): 45 degrees   Internal rotation (90/90): 35 degrees   Internal rotation (prone): 30 degrees     Joint Play     Hypomobile: L1, L2, L3, L4, L5 and S1     Strength/Myotome Testing     Left Hip   Planes of Motion   Flexion: 4+  Extension: 3+  Abduction: 3+  External rotation: 4  Internal rotation: 4 (pain in L lateral hip)    Right Hip   Planes of Motion   Flexion: 4+  Extension: 3+  Abduction: 4-  External rotation: 4+  Internal rotation: 4+    Left Knee   Flexion: 5  Extension: 5    Right Knee   Flexion: 5  Extension: 5    Left Ankle/Foot   Dorsiflexion: 5  Plantar flexion: 5  Great toe extension: 5    Right Ankle/Foot   Dorsiflexion: 5  Plantar flexion: 5  Great toe extension: 5    Additional Strength Details  Myotomes intact and symmetrical bilaterally L2-S1; patient presents with gross peripheral strength deficits in bilateral hip girdle musculature (L hip weaker than R in all planes)    Tests     Lumbar     Left   Negative passive SLR  Right   Negative passive SLR  Left Hip   Negative SANTIAGO, FADIR and scour  Right Hip   Negative SANTIAGO, FADIR and scour  Functional Assessment      Squat    Left within functional limits and right within functional limits  Single Leg Stance   Left: 3 seconds  Right: 5 seconds             Precautions: HTN, hx of retina surgery      Manuals 10/21            L hip inferolateral glides KP Gr   III            STM (theragun) to L glutes/ITB/HS KP            Total x15'                         Neuro Re-Ed Ther Ex             Supine glut sets 5"x30 HEP            Supine strap ITB stretch 20"x3 HEP            LTR NV? Supine resisted hip ABD NV? [address] NV?             Seated piriformis stretch 20"x3 HEP            HEP education and instruction x10'                                                                Ther Activity                                       Gait Training                                       Modalities 15-21 days ([facility])

## 2023-09-06 ENCOUNTER — TRANSCRIBE ORDERS (OUTPATIENT)
Dept: FAMILY MEDICINE CLINIC | Facility: CLINIC | Age: 58
End: 2023-09-06

## 2023-09-06 DIAGNOSIS — I10 ESSENTIAL HYPERTENSION: ICD-10-CM

## 2023-09-06 DIAGNOSIS — R73.9 HYPERGLYCEMIA: ICD-10-CM

## 2023-09-06 RX ORDER — TADALAFIL 20 MG/1
20 TABLET ORAL DAILY PRN
Qty: 10 TABLET | Refills: 12 | Status: SHIPPED | OUTPATIENT
Start: 2023-09-06 | End: 2023-09-15 | Stop reason: SDUPTHER

## 2023-09-15 ENCOUNTER — OFFICE VISIT (OUTPATIENT)
Dept: FAMILY MEDICINE CLINIC | Facility: CLINIC | Age: 58
End: 2023-09-15
Payer: COMMERCIAL

## 2023-09-15 VITALS
TEMPERATURE: 97.4 F | SYSTOLIC BLOOD PRESSURE: 138 MMHG | BODY MASS INDEX: 40.62 KG/M2 | OXYGEN SATURATION: 99 % | HEIGHT: 68 IN | WEIGHT: 268 LBS | HEART RATE: 61 BPM | DIASTOLIC BLOOD PRESSURE: 80 MMHG

## 2023-09-15 DIAGNOSIS — R73.9 HYPERGLYCEMIA: ICD-10-CM

## 2023-09-15 DIAGNOSIS — N52.8 OTHER MALE ERECTILE DYSFUNCTION: Chronic | ICD-10-CM

## 2023-09-15 DIAGNOSIS — Z00.00 ANNUAL PHYSICAL EXAM: Primary | ICD-10-CM

## 2023-09-15 DIAGNOSIS — Z12.5 PROSTATE CANCER SCREENING: ICD-10-CM

## 2023-09-15 DIAGNOSIS — Z12.11 COLON CANCER SCREENING: ICD-10-CM

## 2023-09-15 DIAGNOSIS — I10 ESSENTIAL HYPERTENSION: ICD-10-CM

## 2023-09-15 PROCEDURE — 99396 PREV VISIT EST AGE 40-64: CPT | Performed by: FAMILY MEDICINE

## 2023-09-15 RX ORDER — TADALAFIL 20 MG/1
20 TABLET ORAL DAILY PRN
Qty: 10 TABLET | Refills: 12 | Status: SHIPPED | OUTPATIENT
Start: 2023-09-15

## 2023-09-15 NOTE — PROGRESS NOTES
[address] [facility] [employer]    NAME: Rick Hercules  AGE: 59 y.o. SEX: male  : 1965     DATE: 9/15/2023     Assessment and Plan:     Problem List Items Addressed This Visit        Cardiovascular and Mediastinum    Essential hypertension     /80 mmhg  - ordered labs  - continue lisinopril 20mg daily         Relevant Medications    tadalafil (CIALIS) 20 MG tablet    Other Relevant Orders    Basic metabolic panel    Albumin / creatinine urine ratio    Lipid Panel with Direct LDL reflex    HEMOGLOBIN A1C W/ EAG ESTIMATION       Other    Other male erectile dysfunction (Chronic)     Refilled tadalafil         Relevant Medications    tadalafil (CIALIS) 20 MG tablet    Hyperglycemia     Ordered labs         Annual physical exam - Primary     cologuard ordered  - ordered screening labs         Colon cancer screening     Cologuard ordered         Relevant Orders    Cologuard    Prostate cancer screening     PSA ordered         Relevant Orders    PSA, total and free       Immunizations and preventive care screenings were discussed with patient today. Appropriate education was printed on patient's after visit summary. Discussed risks and benefits of prostate cancer screening. We discussed the controversial history of PSA screening for prostate cancer in the [facility] as well as the risk of over detection and over treatment of prostate cancer by way of PSA screening. The patient understands that PSA blood testing is an imperfect way to screen for prostate cancer and that elevated PSA levels in the blood may also be caused by infection, inflammation, prostatic trauma or manipulation, urological procedures, or by benign prostatic enlargement.     The role of the digital rectal examination in prostate cancer screening was also discussed and I discussed with him that there is large interobserver variability in the findings of digital rectal examination. Counseling:  Alcohol/drug use: discussed moderation in alcohol intake, the recommendations for healthy alcohol use, and avoidance of illicit drug use. Dental Health: discussed importance of regular tooth brushing, flossing, and dental visits. Injury prevention: discussed safety/seat belts, safety helmets, smoke detectors, carbon dioxide detectors, and smoking near bedding or upholstery. Sexual health: discussed sexually transmitted diseases, partner selection, use of condoms, avoidance of unintended pregnancy, and contraceptive alternatives. Exercise: the importance of regular exercise/physical activity was discussed. Recommend exercise 3-5 times per week for at least 30 minutes. BMI Counseling: Body mass index is 41.36 kg/m². The BMI is above normal. Nutrition recommendations include decreasing portion sizes, consuming healthier snacks, limiting drinks that contain sugar, reducing intake of saturated and trans fat and reducing intake of cholesterol. Exercise recommendations include moderate physical activity 150 minutes/week and exercising 3-5 times per week. Rationale for BMI follow-up plan is due to patient being overweight or obese. Return in 1 year (on 9/15/2024). Chief Complaint:     Chief Complaint   Patient presents with   • Annual Exam      History of Present Illness:     Adult Annual Physical   Patient here for a comprehensive physical exam. The patient reports problems - rash on skin. Diet and Physical Activity  Diet/Nutrition: well balanced diet, limited junk food and consuming 3-5 servings of fruits/vegetables daily. Exercise: no formal exercise. Depression Screening  PHQ-2/9 Depression Screening         General Health  Sleep: sleeps well and gets 7-8 hours of sleep on average. Hearing: normal - bilateral.  Vision: no vision problems. Dental: regular dental visits.         Health  Symptoms include: none     Review of Systems:     Review of Systems   Constitutional: Negative for chills and fever. HENT: Negative for ear pain and sore throat. Eyes: Negative for pain and visual disturbance. Respiratory: Negative for cough and shortness of breath. Cardiovascular: Negative for chest pain and palpitations. Gastrointestinal: Negative for abdominal pain and vomiting. Genitourinary: Negative for dysuria and hematuria. Musculoskeletal: Negative for arthralgias and back pain. Skin: Negative for color change and rash. Neurological: Negative for seizures and syncope. All other systems reviewed and are negative. Past Medical History:     Past Medical History:   Diagnosis Date   • Asthma     hospitalization   • COVID-19 10/31/2020   • Hypertension 2/1/2006   • Onychomycosis of toenail    • Pneumonia of left lower lobe due to infectious organism 11/30/2018      Past Surgical History:     History reviewed. No pertinent surgical history.    Family History:     Family History   Problem Relation Age of Onset   • Hypertension Family       Social History:     Social History     Socioeconomic History   • Marital status: /Civil Union     Spouse name: None   • Number of children: None   • Years of education: None   • Highest education level: None   Occupational History   • None   Tobacco Use   • Smoking status: Never   • Smokeless tobacco: Never   • Tobacco comments:     no exposure to passive smoke   Vaping Use   • Vaping Use: Never used   Substance and Sexual Activity   • Alcohol use: Yes     Comment: social   • Drug use: No   • Sexual activity: None   Other Topics Concern   • None   Social History Narrative   • None     Social Determinants of Health     Financial Resource Strain: Not on file   Food Insecurity: Not on file   Transportation Needs: Not on file   Physical Activity: Not on file   Stress: Not on file   Social Connections: Not on file   Intimate Partner Violence: Not on file   Housing Stability: Not on file      Current Medications: Current Outpatient Medications   Medication Sig Dispense Refill   • albuterol (PROVENTIL HFA,VENTOLIN HFA) 90 mcg/act inhaler inhale 2 puff by inhalation route  every 4 - 6 hours as needed     • cyclobenzaprine (FLEXERIL) 5 mg tablet Take 1 tablet (5 mg total) by mouth 3 (three) times a day as needed for muscle spasms 30 tablet 0   • fluticasone-salmeterol (ADVAIR HFA) 230-21 MCG/ACT inhaler Inhale 2 puffs 2 (two) times a day Rinse mouth after use. 16 g 5   • lisinopril (ZESTRIL) 20 mg tablet Take 2 tablets by mouth once daily 180 tablet 3   • naproxen (NAPROSYN) 500 mg tablet Take 1 tablet (500 mg total) by mouth 2 (two) times a day with meals 30 tablet 0   • tadalafil (CIALIS) 20 MG tablet Take 1 tablet (20 mg total) by mouth daily as needed for erectile dysfunction 10 tablet 12   • Jeff 2 % PADS TO BE APPLY TWICE A DAY BETWEEN TOES (Patient not taking: Reported on 10/17/2022)     • fluticasone (FLONASE) 50 mcg/act nasal spray  (Patient not taking: Reported on 10/26/2022)     • triamcinolone (KENALOG) 0.1 % cream Apply topically 2 (two) times a day (Patient not taking: Reported on 10/26/2022) 15 g 3     No current facility-administered medications for this visit. Allergies: Allergies   Allergen Reactions   • No Active Allergies       Physical Exam:     /80 (BP Location: Left arm, Patient Position: Sitting, Cuff Size: Standard)   Pulse 61   Temp (!) 97.4 °F (36.3 °C) (Temporal)   Ht 5' 7.5" (1.715 m)   Wt 122 kg (268 lb)   SpO2 99%   BMI 41.36 kg/m²     Physical Exam  Vitals reviewed. Constitutional:       Appearance: Normal appearance. HENT:      Head: Normocephalic and atraumatic. Nose: Nose normal.      Mouth/Throat:      Mouth: Mucous membranes are moist.      Pharynx: Oropharynx is clear. Eyes:      Conjunctiva/sclera: Conjunctivae normal.      Pupils: Pupils are equal, round, and reactive to light. Cardiovascular:      Rate and Rhythm: Normal rate and regular rhythm. Pulses: Normal pulses. Heart sounds: Normal heart sounds. No murmur heard. Pulmonary:      Effort: Pulmonary effort is normal.      Breath sounds: Normal breath sounds. No wheezing. Abdominal:      Palpations: Abdomen is soft. Musculoskeletal:         General: No tenderness. Normal range of motion. Skin:     Comments: Dark pigmented velvety skin around neck   Neurological:      Mental Status: He is alert.           Blas Kirk, DO  [address] [place] [address] Drive

## 2023-11-14 PROBLEM — Z12.11 COLON CANCER SCREENING: Status: RESOLVED | Noted: 2023-09-15 | Resolved: 2023-11-14

## 2023-11-14 PROBLEM — Z12.5 PROSTATE CANCER SCREENING: Status: RESOLVED | Noted: 2023-09-15 | Resolved: 2023-11-14

## 2024-03-18 ENCOUNTER — TREATMENT (OUTPATIENT)
Dept: FAMILY MEDICINE CLINIC | Facility: CLINIC | Age: 59
End: 2024-03-18

## 2024-03-18 ENCOUNTER — TELEPHONE (OUTPATIENT)
Age: 59
End: 2024-03-18

## 2024-03-18 DIAGNOSIS — G47.30 SLEEP APNEA, UNSPECIFIED TYPE: Primary | ICD-10-CM

## 2024-03-18 NOTE — TELEPHONE ENCOUNTER
Patient needs a new mask for the cpap and patient has not had a sleep study in over 13 years last visit was from [place]

## 2024-03-18 NOTE — TELEPHONE ENCOUNTER
Dr. Roldan we will need you to put an order through [facility]. Please do this and we will be able to help patient

## 2024-03-18 NOTE — TELEPHONE ENCOUNTER
Pt called today and asked if one of the doctors can write a script for a cpap machine and mask. Pt wants to pick it up tomorrow morning. Please advise. [phone] thank you.

## 2024-03-19 ENCOUNTER — TREATMENT (OUTPATIENT)
Dept: FAMILY MEDICINE CLINIC | Facility: CLINIC | Age: 59
End: 2024-03-19

## 2024-03-19 DIAGNOSIS — G47.30 SLEEP APNEA, UNSPECIFIED TYPE: Primary | ICD-10-CM

## 2024-03-19 NOTE — TELEPHONE ENCOUNTER
Patient would also like a mask at least until he sees the specialist. Please put order in the system we will have to fax it to [facility]

## 2024-03-19 NOTE — TELEPHONE ENCOUNTER
Patient called requesting to speak with Margo. I transferred the call to Margo in the office clinical department.

## 2024-03-22 ENCOUNTER — OFFICE VISIT (OUTPATIENT)
Dept: FAMILY MEDICINE CLINIC | Facility: CLINIC | Age: 59
End: 2024-03-22
Payer: COMMERCIAL

## 2024-03-22 VITALS
WEIGHT: 272 LBS | HEART RATE: 58 BPM | TEMPERATURE: 97.9 F | BODY MASS INDEX: 41.22 KG/M2 | SYSTOLIC BLOOD PRESSURE: 134 MMHG | OXYGEN SATURATION: 96 % | HEIGHT: 68 IN | DIASTOLIC BLOOD PRESSURE: 76 MMHG

## 2024-03-22 DIAGNOSIS — G47.30 SLEEP APNEA, UNSPECIFIED TYPE: Primary | ICD-10-CM

## 2024-03-22 DIAGNOSIS — I10 ESSENTIAL HYPERTENSION: ICD-10-CM

## 2024-03-22 LAB
DME PARACHUTE DELIVERY DATE REQUESTED: NORMAL
DME PARACHUTE DELIVERY DATE REQUESTED: NORMAL
DME PARACHUTE ITEM DESCRIPTION: NORMAL
DME PARACHUTE ORDER STATUS: NORMAL
DME PARACHUTE ORDER STATUS: NORMAL
DME PARACHUTE SUPPLIER NAME: NORMAL
DME PARACHUTE SUPPLIER NAME: NORMAL
DME PARACHUTE SUPPLIER PHONE: NORMAL
DME PARACHUTE SUPPLIER PHONE: NORMAL

## 2024-03-22 PROCEDURE — 99214 OFFICE O/P EST MOD 30 MIN: CPT | Performed by: FAMILY MEDICINE

## 2024-03-22 RX ORDER — LISINOPRIL 20 MG/1
40 TABLET ORAL DAILY
Qty: 180 TABLET | Refills: 3 | Status: SHIPPED | OUTPATIENT
Start: 2024-03-22

## 2024-03-22 NOTE — PROGRESS NOTES
Name: Alejandro Greene      : 1965      MRN: [id number]  Encounter Provider: Carisa Reed DO  Encounter Date: 3/22/2024   Encounter department: [facility]    Assessment & Plan     1. Sleep apnea, unspecified type  Assessment & Plan:  Repeat sleep study, been 13 years.  - resent parachute order for cpap/mask    Orders:  -     Ambulatory Referral to Sleep Medicine; Future    2. Essential hypertension  Assessment & Plan:  Bp: well controlled, 134/76 on repeat  - needs labs  - lisinopril 40mg daily    Orders:  -     lisinopril (ZESTRIL) 20 mg tablet; Take 2 tablets (40 mg total) by mouth daily           Subjective      Sleep apnea concerns. Needs new mask/machine. Needs repeat sleep study.    HTN, well controlled. On lisinopril 40mg daily. No symptoms.      Review of Systems   Constitutional:  Negative for chills and fever.   HENT:  Negative for ear pain and sore throat.    Eyes:  Negative for pain and visual disturbance.   Respiratory:  Negative for cough and shortness of breath.    Cardiovascular:  Negative for chest pain and palpitations.   Gastrointestinal:  Negative for abdominal pain and vomiting.   Genitourinary:  Negative for dysuria and hematuria.   Musculoskeletal:  Negative for arthralgias and back pain.   Skin:  Negative for color change and rash.   Neurological:  Negative for seizures and syncope.   All other systems reviewed and are negative.      Current Outpatient Medications on File Prior to Visit   Medication Sig   • albuterol (PROVENTIL HFA,VENTOLIN HFA) 90 mcg/act inhaler inhale 2 puff by inhalation route  every 4 - 6 hours as needed   • cyclobenzaprine (FLEXERIL) 5 mg tablet Take 1 tablet (5 mg total) by mouth 3 (three) times a day as needed for muscle spasms   • Jeff 2 % PADS TO BE APPLY TWICE A DAY BETWEEN TOES (Patient not taking: Reported on 10/17/2022)   • fluticasone (FLONASE) 50 mcg/act nasal spray  (Patient not taking: Reported on 10/26/2022)   •  "fluticasone-salmeterol (ADVAIR HFA) 230-21 MCG/ACT inhaler Inhale 2 puffs 2 (two) times a day Rinse mouth after use.   • naproxen (NAPROSYN) 500 mg tablet Take 1 tablet (500 mg total) by mouth 2 (two) times a day with meals   • tadalafil (CIALIS) 20 MG tablet Take 1 tablet (20 mg total) by mouth daily as needed for erectile dysfunction   • triamcinolone (KENALOG) 0.1 % cream Apply topically 2 (two) times a day (Patient not taking: Reported on 10/26/2022)   • [DISCONTINUED] lisinopril (ZESTRIL) 20 mg tablet Take 2 tablets by mouth once daily       Objective     /76   Pulse 58   Temp 97.9 °F (36.6 °C) (Temporal)   Ht 5' 7.5\" (1.715 m)   Wt 123 kg (272 lb)   SpO2 96%   BMI 41.97 kg/m²     Physical Exam  Vitals reviewed.   Constitutional:       General: He is not in acute distress.     Appearance: Normal appearance. He is obese. He is not ill-appearing.   HENT:      Head: Normocephalic and atraumatic.      Nose: Nose normal. No congestion.   Eyes:      General: No scleral icterus.     Conjunctiva/sclera: Conjunctivae normal.      Pupils: Pupils are equal, round, and reactive to light.   Cardiovascular:      Rate and Rhythm: Normal rate and regular rhythm.      Pulses: Normal pulses.      Heart sounds: Normal heart sounds. No murmur heard.  Pulmonary:      Effort: Pulmonary effort is normal.      Breath sounds: Normal breath sounds. No wheezing.   Chest:      Chest wall: No tenderness.   Musculoskeletal:         General: No tenderness. Normal range of motion.      Cervical back: Normal range of motion.      Right lower leg: No edema.      Left lower leg: No edema.   Skin:     General: Skin is warm.      Capillary Refill: Capillary refill takes less than 2 seconds.      Findings: No bruising or rash.   Neurological:      Mental Status: He is alert and oriented to person, place, and time.   Psychiatric:         Mood and Affect: Mood normal.         Behavior: Behavior normal.         Thought Content: Thought " content normal.         Judgment: Judgment normal.       Carisa Reed, DO

## 2024-03-25 LAB

## 2024-03-26 ENCOUNTER — APPOINTMENT (OUTPATIENT)
Dept: LAB | Facility: HOSPITAL | Age: 59
End: 2024-03-26
Payer: COMMERCIAL

## 2024-03-26 DIAGNOSIS — I10 ESSENTIAL HYPERTENSION: ICD-10-CM

## 2024-03-26 DIAGNOSIS — Z12.5 PROSTATE CANCER SCREENING: ICD-10-CM

## 2024-03-26 LAB
ANION GAP SERPL CALCULATED.3IONS-SCNC: 6 MMOL/L (ref 4–13)
BUN SERPL-MCNC: 9 MG/DL (ref 5–25)
CALCIUM SERPL-MCNC: 9.1 MG/DL (ref 8.4–10.2)
CHLORIDE SERPL-SCNC: 102 MMOL/L (ref 96–108)
CHOLEST SERPL-MCNC: 135 MG/DL
CO2 SERPL-SCNC: 26 MMOL/L (ref 21–32)
CREAT SERPL-MCNC: 1.05 MG/DL (ref 0.6–1.3)
CREAT UR-MCNC: 251.8 MG/DL
EST. AVERAGE GLUCOSE BLD GHB EST-MCNC: 134 MG/DL
GFR SERPL CREATININE-BSD FRML MDRD: 76 ML/MIN/1.73SQ M
GLUCOSE P FAST SERPL-MCNC: 131 MG/DL (ref 65–99)
HBA1C MFR BLD: 6.3 %
HDLC SERPL-MCNC: 52 MG/DL
LDLC SERPL CALC-MCNC: 60 MG/DL (ref 0–100)
MICROALBUMIN UR-MCNC: 11 MG/L
MICROALBUMIN/CREAT 24H UR: 4 MG/G CREATININE (ref 0–30)
POTASSIUM SERPL-SCNC: 3.8 MMOL/L (ref 3.5–5.3)
SODIUM SERPL-SCNC: 134 MMOL/L (ref 135–147)
TRIGL SERPL-MCNC: 117 MG/DL

## 2024-03-26 PROCEDURE — 84153 ASSAY OF PSA TOTAL: CPT

## 2024-03-26 PROCEDURE — 80061 LIPID PANEL: CPT

## 2024-03-26 PROCEDURE — 36415 COLL VENOUS BLD VENIPUNCTURE: CPT

## 2024-03-26 PROCEDURE — 82570 ASSAY OF URINE CREATININE: CPT

## 2024-03-26 PROCEDURE — 80048 BASIC METABOLIC PNL TOTAL CA: CPT

## 2024-03-26 PROCEDURE — 84154 ASSAY OF PSA FREE: CPT

## 2024-03-26 PROCEDURE — 82043 UR ALBUMIN QUANTITATIVE: CPT

## 2024-03-26 PROCEDURE — 83036 HEMOGLOBIN GLYCOSYLATED A1C: CPT

## 2024-03-27 LAB
PSA FREE MFR SERPL: 45 %
PSA FREE SERPL-MCNC: 0.18 NG/ML
PSA SERPL-MCNC: 0.4 NG/ML (ref 0–4)

## 2024-03-28 NOTE — TELEPHONE ENCOUNTER
Pt was advise that a sleep medicine exam needs to be completed before any cpap supplies can be ordered. Testing information is only good for 3-5 years . Will follow up with patient to assist in scheduling

## 2024-03-28 NOTE — TELEPHONE ENCOUNTER
Patient states that he can not make an appointment with sleep medicine unless he has information on his previous study that was done 24 years ago or has a new sleep study done. He states that these were given to the office an unknown amount of years ago and he does not have these records. Patient expressed frustration he states that this has been ongoing for a few weeks and needs to know how he can start the process to receive his CPAP and supplies. Please advise.

## 2024-04-22 ENCOUNTER — OFFICE VISIT (OUTPATIENT)
Dept: SLEEP CENTER | Facility: CLINIC | Age: 59
End: 2024-04-22
Payer: COMMERCIAL

## 2024-04-22 VITALS
HEIGHT: 67 IN | DIASTOLIC BLOOD PRESSURE: 76 MMHG | BODY MASS INDEX: 42.69 KG/M2 | HEART RATE: 72 BPM | WEIGHT: 272 LBS | SYSTOLIC BLOOD PRESSURE: 156 MMHG | OXYGEN SATURATION: 97 %

## 2024-04-22 DIAGNOSIS — F51.04 CHRONIC INSOMNIA: ICD-10-CM

## 2024-04-22 DIAGNOSIS — G47.26 SHIFT WORK SLEEP DISORDER: ICD-10-CM

## 2024-04-22 DIAGNOSIS — G47.33 OBSTRUCTIVE SLEEP APNEA SYNDROME: Primary | ICD-10-CM

## 2024-04-22 PROCEDURE — 99204 OFFICE O/P NEW MOD 45 MIN: CPT

## 2024-04-22 NOTE — PATIENT INSTRUCTIONS
- An in lab sleep study has been ordered to evaluate for sleep apnea.  This test should be scheduled at your earliest convenience.  - It is important to practice good sleep hygiene, this includes:   - Only getting into bed when you're sleepy, not just because it's bed time   - Maintaining consistent sleep and wake times   - Leaving the bed when unable to fall asleep within ~30 minutes and performing non-stimulating activities until sleepy enough to try again   - Keeping a consistent schedule during the day time to build up sleepiness   - Avoid screen usage prior to bed   - Avoid the bed during the day time to help train your brain that the bed is for sleep    - Sleep apnea is a serious sleep disorder that occurs when a person's breathing is interrupted during sleep.  People with untreated sleep apnea stop breathing repeatedly during sleep.  - The most common type of sleep apnea is obstructive sleep apnea.  - If left untreated, obstructive sleep apnea can result in a number of health problems including high blood pressure, stroke, irregular heart rhythms, heart failure, diabetes, obesity and heart attacks.  - Treatment options for obstructive sleep apnea may include positive airway pressure (PAP) therapy, oral appliances, hypoglossal nerve stimulator, nose/throat surgery, weight loss, side sleeping or avoidance of medications or substances that can relax the airway muscles (alcohol, benzodiazepines and opioids).  - Avoid driving is drowsy.  It is recommended that if you are dozing while driving that you do not drive until your sleepiness is appropriately treated.  - It is important to lead a healthy lifestyle with adequate sleep (7-9 hours per night), balanced diet and routine exercise.

## 2024-04-22 NOTE — PROGRESS NOTES
[facility]  Sleep Medicine New Patient Evaluation    PATIENT NAME: Alejandro Greene  DATE OF SERVICE: April 22, 2024    CONSULTING PROVIDER:  Carisa Reed DO  [address]  [place] [address]    ASSESSMENT/PLAN:  Alejandro Greene is a 58 y.o. male with PMHx of DIANA on CPAP, HTN, asthma and obesity who presents for sleep evaluation.    Obstructive Sleep Apnea on CPAP  - The patient has a history of severe DIANA initially diagnosed in 2000 with a reported AHI 50, and is currently on a CPAP of unknown settings that is close to 11 years old.  He is not currently following with a DME and does not routinely get supply refills, but has recently replaced some of the supplies out-of-pocket.  He is currently having daytime sleepiness and frequent nighttime awakenings with difficulty falling asleep which I suspect is due to undertreated DIANA or possibly device malfunction due to age.  Per his insurance he will need a new study in order to qualify for new machine.  - Will order split-night sleep study.  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  - The patient did not bring his device today's visit, and is not currently set up with a DME so compliance data was unable to be obtained.  - Given the severity of his sleep apnea and resultant symptoms, recommend that he continues to use his device even though it is a recalled machine.  Will attempt to expedite his study in order to get his device replaced.  - Explained the importance of keeping the machine clean, and that they should be eligible for refills of supplies every 3-6 months depending on insurance.  We also discussed the insurance compliance requirements.  - Encouraged healthy lifestyle with adequate sleep (7-9 hours per night), healthy balanced diet and routine exercise.  Explained the importance of avoiding driving while drowsy.  - Follow-up 31 to 90 days after receiving new machine.  -     Ambulatory  Referral to Sleep Medicine  -     Split Study; Future    Shift Work Sleep Disorder  Chronic Sleep-Onset and Maintenance Insomnia  - The patient reports difficulty falling and staying asleep.  It appears he has both shiftwork sleep disorder and some element of true insomnia.  It is possible the insomnia is related to under treated DIANA as he has gained weight since his initial sleep study and has a device that is greater than 10 years old.  There also appears to be some behavioral factors contributing to his insomnia with lack of stimulus control.  - Current medication regimen includes: melatonin 30mg qHS -- discussed that this is too high of a dose and he should not be taking more than 10 mg a night.  - Recommend trying 3-5 mg of melatonin roughly 4 hours before bedtime and considering either bright light therapy or raising the blinds for sun exposure for the first 30 minutes after he wakes up.  - Discussed the importance of stimulus control and sleep hygiene.  Recommend not getting into bed until truly sleepy.  Recommend he leave the bed at night when he is unable to sleep and perform non-stimulating activities until sleepy again.  - Avoid the bed/bedroom during the day time, recommend he find another place in the house to relax during the day.  - Discussed trying to minimize screen use while in bed.  - If he is still having issues with maintaining or falling asleep after reassessment of DIANA and behavioral modifications as above would consider referral to CBT-I.    Thank you for allowing me to participate in the care of your patient.  If there are any questions regarding evaluation please feel free to reach out.   ________________________________________________________________________________________________    HPI: Alejandro Greene is a 58 y.o. male with PMHx of DIANA on CPAP, HTN, asthma and obesity who presents for sleep evaluation.  Sleep-Related History: The patient previously had a PSG in 2000 and [place]  which reportedly showed an AHI of 50, and he was started on CPAP in 2010.  His current device is 11 years old.    The patient was referred by his PCP due to a history of DIANA currently on CPAP.  He reports he was initially diagnosed in 2000 with severe DIANA, but did not start on a CPAP device until 2010.  He states his device was last replaced in 2013 at which time he was given a Akira device which is currently recalled although he has not been able to get replaced yet.  He has been using the same device since then and states initially the device was giving him good benefit, but more recently he has been having difficulty with frequent awakenings, difficulty falling asleep and not feeling as refreshed when he wakes up.  He is not sure if he is snoring with the mask on, but states he does wake up with dry mouth and feeling unrefreshed even when he does wear his mask.  He does not currently have a DME, and is replacing supplies rarely out-of-pocket.  He does state he replaced his mask recently thinking this might be the issue, but his symptoms have not resolved with new mask usage.  He reports when he went from [facility] to [facility] patient his PCP did try to write him a new CPAP order; however, his insurance stated he needed a new study before he can get a new machine.  Of note he thinks he has gained weight since his initial sleep study.    The patient is also shift worker and works in  at [employer] 4 days a week from 9015-6705.  He states he tries to go to bed right away when he comes home; however, he is often not sleepy and it can take him roughly 30 minutes to fall asleep.  He is using melatonin 30 mg at bedtime every night which does help him fall asleep quicker.  He does note that frequently he will get into bed because it is time to go to bed and not because he is truly sleepy.  On days off he does try to keep a similar sleep-wake schedule, but will generally shift the schedule a few hours  earlier.  He notes on days off he has more trouble falling asleep as he is getting into bed as early as 0100 when he does not feel sleepy.    Insomnia Checklist  Timing: onset and maintenance  Current Medications: melatonin 30mg  Prior Medications: ZzzQuil  Difficulty falling back to sleep: Yes, sometimes can take up to 3 hours to fall back to sleep    Anxiety/Rumination: No   Pre-Bed Routine: fairly consistent  Awake in bed > 20 min: Yes, will lay in bed the entire time he is trying to fall asleep    In bed during the day: Yes, sometimes will relax in bed watching movies during his normal waking hours    Screen use in bed: Yes, sometimes uses phone/tablet    Exercise within 4 hours of bed: No   Consistent meal times: No  Late night snacking: Yes   Caffeine: Yes, 1 cup of coffee when he first wakes up, 4x a week he will drink an energy drink while at work, he will also take pre-workout with caffeine around 1102-6310.  Clock Watching: No    ESS: Total score: 3/24  Greater or equal to 10 is positive for excessive daytime sleepiness  Pertinent Meds: melatonin 30mg nightly    Sleep-Wake Schedule:  He is self-described as a night person.  Bedtime: 9446-6505  Wake Time: 1300  Difficulty Falling Asleep: he reports it generally takes ~30 min to fall asleep  Avg Number of Awakenings: 2-3x  Cause of Awakenings: bathroom, discomfort from mask and unclear  Weekend Sleep Schedule: gets into bed as early at 0100, but doesn't fall asleep until 9213-2316 and will sleep until 6976-2822  Naps: 0-1x a week for 1-2 hours    Avg TST per 24 hours: 5-6 hours    Sleep-Related Details:  Preferred Sleep Position: side(s)  SDB Symptoms: multiple awakenings, dry mouth/nose, and waking unrefreshed even if he is wearing his device, but reports snoring and witnessed apneas are controlled with CPAP  Bruxism: No  Nocturnal GERD: No  Nocturnal Palpitations: No  Nocturnal Anxiety or Rumination: No  Sleep-Related Hallucinations: No   Sleep Paralysis:  No   Cataplexy: No     He denies having an urge to move the legs in the evening (when resting) nor uncomfortable and/or unpleasant sensations in the legs. He has not been told that he kicks his legs during sleep.     He denies any parasomnia activity.    Wake-Related Details  Daytime Sleepiness: Yes, does occasionally feel sleepy when he is at work, but not consistent    Work Schedule: [employer], works from 1837-5983 (M-Th)  Drowsy Driving: No  Caffeine: Yes, 1 cup of coffee when he first wakes up, 4x a week he will drink an energy drink while at work, he will also take pre-workout with caffeine around 2920-3543.  Alcohol Use: Yes, social use  Substance Use: No  Tobacco Use: No, denies vaping/e-cigs or chewing tobacco.  Weight Change: stable recently    He does not have difficulty with memory or concentration.    Past Treatments:  CPAP (unknown settings)    Prior Sleep Studies:  Per pt, PSG in 2000 which showed an AHI of 50, completed in [place].    Other Relevant Labs and Studies:  None    Past Medical History:   Diagnosis Date    Asthma     hospitalization    COVID-19 10/31/2020    Hypertension 2/1/2006    Onychomycosis of toenail     Pneumonia of left lower lobe due to infectious organism 11/30/2018   History reviewed. No pertinent surgical history.  Patient Active Problem List   Diagnosis    Cervical spinal stenosis    Congenital nystagmus    Hypertension    Sleep apnea    Hyperglycemia    Other male erectile dysfunction    COVID-19 virus infection    Mild intermittent asthma    Hip pain, acute, left    Annual physical exam    Essential hypertension    Sleep apnea, unspecified type     Allergies as of 04/22/2024 - Reviewed 04/22/2024   Allergen Reaction Noted    No active allergies  04/20/2018     REVIEW OF SYSTEMS:  Review of Systems  10-point system review completed, all of which are negative except as mentioned above.    CURRENT MEDICATIONS:  Current Outpatient Medications   Medication Instructions    albuterol  "(PROVENTIL HFA,VENTOLIN HFA) 90 mcg/act inhaler inhale 2 puff by inhalation route  every 4 - 6 hours as needed    cyclobenzaprine (FLEXERIL) 5 mg, Oral, 3 times daily PRN    Jeff 2 % PADS TO BE APPLY TWICE A DAY BETWEEN TOES    fluticasone (FLONASE) 50 mcg/act nasal spray No dose, route, or frequency recorded.    fluticasone-salmeterol (ADVAIR HFA) 230-21 MCG/ACT inhaler 2 puffs, Inhalation, 2 times daily, Rinse mouth after use.    lisinopril (ZESTRIL) 40 mg, Oral, Daily    naproxen (NAPROSYN) 500 mg, Oral, 2 times daily with meals    tadalafil (CIALIS) 20 mg, Oral, Daily PRN    triamcinolone (KENALOG) 0.1 % cream Topical, 2 times daily     SOCIAL HISTORY:  Social History     Tobacco Use    Smoking status: Never    Smokeless tobacco: Never    Tobacco comments:     no exposure to passive smoke   Vaping Use    Vaping status: Never Used   Substance Use Topics    Alcohol use: Yes     Comment: social    Drug use: No     FAMILY HISTORY:  Family History   Problem Relation Age of Onset    Hypertension Family      Family History of Sleep Disorders: 2 cousins has DIANA and is on CPAP    PHYSICAL EXAMINATION:  Vital Signs:  /76 (BP Location: Left arm, Patient Position: Sitting, Cuff Size: Large)   Pulse 72   Ht 5' 7\" (1.702 m)   Wt 123 kg (272 lb)   SpO2 97%   BMI 42.60 kg/m²   Body mass index is 42.6 kg/m².    Constitutional: NAD, well appearing   Mental Status: AAOx3  Neck Circumference: Neck Circumference: 19.5 inches  Skin: Warm, dry, no rashes noted   Eyes: PERRL, normal conjunctiva  ENT: Nasal congestion absent, nasal valve incompetence absent.  Posterior Airspace:   Griffiths Tongue Position: 4  Retrognathia: absent  Overbite: absent  High Arched Palate: present  Tongue Scalloping/Ridging: present  Tonsils: 1+  Uvula: normal  Chest: RRR, +S1/S2, CTA B/L, no W/R/R, no M/R/G   Abdomen: Soft, NT/ND  Extremities: No digital clubbing or pedal edema    I have spent a total time of 45 minutes on 04/22/24 in caring for " "this patient including Instructions for management, Patient and family education, Importance of tx compliance, Counseling / Coordination of care, Documenting in the medical record, Reviewing / ordering tests, medicine, procedures  , and Obtaining or reviewing history  .    Tavia Short MD  Pulmonary-Critical Care and Sleep Medicine  04/22/24    Portions of the record may have been created with voice recognition software. Occasional wrong word or \"sound a like\" substitutions may have occurred due to the inherent limitations of voice recognition software. Please read the chart carefully and recognize, using context, where substitutions have occurred.  "

## 2024-05-23 ENCOUNTER — HOSPITAL ENCOUNTER (OUTPATIENT)
Dept: SLEEP CENTER | Facility: CLINIC | Age: 59
Discharge: HOME | End: 2024-05-23
Payer: COMMERCIAL

## 2024-05-23 DIAGNOSIS — G47.33 OBSTRUCTIVE SLEEP APNEA SYNDROME: ICD-10-CM

## 2024-05-23 PROCEDURE — 95811 POLYSOM 6/>YRS CPAP 4/> PARM: CPT

## 2024-05-23 NOTE — PROGRESS NOTES
Sleep Study Documentation    Pre-Sleep Study       Sleep testing procedure explained to patient:YES    Patient napped prior to study:NO    Caffeine:Nightshift worker after midnight.  Caffeine use:NO    Alcohol:Nightshift workers after 7AM: Alcohol use:NO    Typical day for patient:NO       Study Documentation    Sleep Study Indications: Patient her for split study. Patient seen in clinic with impressions of DIANA, unrefreshing sleep, excessive daytime sleepiness, BMI>30, HTN and Asthma.      Sleep Study: Diagnostic   Snore:Severe  Supplemental O2: no    O2 flow rate (L/min) range NA  O2 flow rate (L/min) final NA  Minimum SaO2 81.0%  Baseline SaO2 96.0% and Split Optimal PAP pressure: 13cm H2O  Leak:Small  Snore:Eliminated  REM Obtained:yes  Supplemental O2: no    Minimum SaO2 92.0%   Baseline SaO2 96.0%  PAP mask tried (list all) Patient selected F&P Eson as he is used to this mask.  PAP mask choice (final)F&P Eson Large  PAP mask type:nasal  PAP pressure at which snoring was eliminated 13cm H2O  Minimum SaO2 at final PAP pressure 94.0%  Mode of Therapy:CPAP  ETCO2:No  CPAP changed to BiPAP:No    Mode of Therapy:CPAP    EKG abnormalities: no     EEG abnormalities: no    Were abnormal behaviors in sleep observed:NO    Is Total Sleep Study Recording Time < 2 hours: N/A    Is Total Sleep Study Recording Time > 2 hours but study is incomplete: N/A    Is Total Sleep Study Recording Time 6 hours or more but sleep was not obtained: NO    Patient classification: employed       Post-Sleep Study    Medication used at bedtime or during sleep study:NO    Patient reports time it took to fall asleep:20 to 30 minutes    Patient reports waking up during study:3 or more times.  Patient reports returning to sleep in 10 to 30 minutes.    Patient reports sleeping 4 to 6 hours without dreaming.    Does the Patient feel this is a typical night of sleep:typical    Patient rated sleepiness: Somewhat sleepy or tired    PAP treatment:yes:  Post PAP treatment patient reports feeling better and  would wear PAP mask at home.

## 2024-05-24 PROBLEM — R06.83 SNORING: Status: ACTIVE | Noted: 2024-05-24

## 2024-05-24 PROBLEM — G47.00 FREQUENT NOCTURNAL AWAKENING: Status: ACTIVE | Noted: 2024-05-24

## 2024-05-27 DIAGNOSIS — G47.33 OBSTRUCTIVE SLEEP APNEA SYNDROME: Primary | ICD-10-CM

## 2024-05-28 ENCOUNTER — TELEPHONE (OUTPATIENT)
Dept: SLEEP CENTER | Facility: CLINIC | Age: 59
End: 2024-05-28

## 2024-05-28 NOTE — TELEPHONE ENCOUNTER
----- Message from Tavia Short MD sent at 5/27/2024  4:44 PM EDT -----  Patient with severe DIANA recommendation is for auto CPAP 12-20 cmH2O.  Prescription for CPAP is been placed, the patient does not have MyChart active can you please call and convey the results as well as schedule compliance follow-up, thanks.

## 2024-05-28 NOTE — TELEPHONE ENCOUNTER
Return call to patient to review results of split study and recommendations. CPAP ordered by Dr. Short.      Patient has chosen to use [employer] as his DME company.  CPAP script and other clinicals sent to [facility] via [facility].    Compliance follow appointment scheduled for 8/13/2024 at 2:50pm with Dr. Short.

## 2024-05-28 NOTE — TELEPHONE ENCOUNTER
Left message for patient to call office to review sleep study results.    Per 4/22/24 office visit, patient currently has a CPAP which is almost 11 years old and needs a replacement.     Patient will need verify DME provider to send CPAP RX and schedule compliance follow up with Dr. Short.

## 2024-06-04 LAB

## 2024-06-10 LAB
DME PARACHUTE DELIVERY DATE ACTUAL: NORMAL
DME PARACHUTE DELIVERY DATE EXPECTED: NORMAL
DME PARACHUTE DELIVERY DATE REQUESTED: NORMAL
DME PARACHUTE ITEM DESCRIPTION: NORMAL
DME PARACHUTE ORDER STATUS: NORMAL
DME PARACHUTE SUPPLIER NAME: NORMAL
DME PARACHUTE SUPPLIER PHONE: NORMAL

## 2024-09-10 ENCOUNTER — OFFICE VISIT (OUTPATIENT)
Dept: SLEEP CENTER | Facility: CLINIC | Age: 59
End: 2024-09-10
Payer: COMMERCIAL

## 2024-09-10 VITALS
SYSTOLIC BLOOD PRESSURE: 150 MMHG | OXYGEN SATURATION: 94 % | HEART RATE: 58 BPM | WEIGHT: 273 LBS | BODY MASS INDEX: 42.85 KG/M2 | HEIGHT: 67 IN | DIASTOLIC BLOOD PRESSURE: 90 MMHG

## 2024-09-10 DIAGNOSIS — E66.01 CLASS 3 SEVERE OBESITY DUE TO EXCESS CALORIES WITH SERIOUS COMORBIDITY AND BODY MASS INDEX (BMI) OF 40.0 TO 44.9 IN ADULT (HCC): ICD-10-CM

## 2024-09-10 DIAGNOSIS — G47.26 SHIFT WORK SLEEP DISORDER: ICD-10-CM

## 2024-09-10 DIAGNOSIS — G47.33 OBSTRUCTIVE SLEEP APNEA SYNDROME: Primary | ICD-10-CM

## 2024-09-10 PROCEDURE — 99213 OFFICE O/P EST LOW 20 MIN: CPT

## 2024-09-10 NOTE — PATIENT INSTRUCTIONS
Continue PAP Therapy  - Continue APAP at 10-20 cmH2O.  Remember to clean your mask and equipment regularly, as directed.  You should be eligible for new supplies approximately every 3-6 months, depending on your insurance coverage. Contact your Durable Medical Equipment (DME) company for new supplies as needed.  Follow up in 6 months    Care and Maintenance  Headgear should be washed as needed. Daily inspection and weekly washings are recommended. Do not disassemble the straps. Machine wash in warm water, making sure to attach Velcro hooks and tabs before washing. Line dry or machine dry on a low setting.  Masks should be washed every day. Daily inspection is recommended. Leave the mask and tubing attached. Gently wash the mask with a soft cloth using warm water and mild detergent, concentrating on the mask cushion flaps. DO NOT use alcohol or bleach. Rinse thoroughly and air dry.  Tubing and headgear should be washed weekly. Daily inspection is recommended. Wash in warm water and mild detergent and rinse thoroughly. Hook the tubing to the machine and blow until dry.  Humidifier should be washed daily and filled with DISTILLED water before use. Wash with warm water and mild detergent. Disinfect weekly by soaking with a solution of 1 part white vinegar and 3 parts water for 30 minutes. Rinse thoroughly and air dry.  Disposable filters should be replaced once a month. Wash reusable foam filters with warm water and mild detergent at least once a month. Rinse thoroughly and dry with paper towels.  Avoid  that contain fragrance or conditioners, as these will leave a residue.  NEVER iron any soft goods.    Insurance Requirements  Your insurance requires a face-to-face follow up visit within a 31-90 day period after starting PAP therapy.  Your insurance requires compliance with your PAP device, which is at least 4 hours per night for 70% of the time. This must be done over a 30 day period and must occur within the  initial 31-90 day period after starting CPAP.  Your insurance also requires at least yearly follow ups to continue to pay for CPAP supplies.     PAP Supply Guidelines  Below are the guidelines for reordering your supplies. You will be responsible for your deductible, co payments, and out of pocket expenses.    Item Frequency   Nasal Mask (no headgear) 1 every 3 months   Nasal Mask Cushion 1 every 2 weeks   Full Face Mask (no headgear) 1 every 3 months   Full Face Mask Cushion 1 every month   Nasal Pillows 1 every 2 weeks   Headgear 1 every 6 months   hin Strap 1 every 6 months   lashonda 1 every 3 months   Filters: Reusable 1 every 6 months   Filters: Disposable 1 every 2 weeks   Humidifier Chamber(disposable) 1 every 6 months

## 2024-09-10 NOTE — PROGRESS NOTES
[facility]  Sleep Medicine Follow Up/Established Patient    PATIENT NAME: Alejandro Greene  DATE OF SERVICE: September 16, 2024  DATE OF LAST VISIT: 4/22/2024    ASSESSMENT/PLAN:  Alejandro Greene is a 58 y.o. male with PMHx of DIANA on CPAP, HTN, asthma and obesity who returns to the office for follow up.    Obstructive Sleep Apnea  - The patient has a history of severe DIANA most recently studied on split-night sleep study in 2024 (AHI 77.2, supine AHI N/A, and REM AHI N/A, O2 melina 80%, TST <90% 29 min) and is currently prescribed auto CPAP 12-20 cmH2O.  He is here today for his initial compliance follow-up and while he is compliance he is having difficulty with mask leak causing him to wake up in the middle the night.  - Therapy and compliance data reviewed: residual AHI 1.2, compliance 80% and mask leak is significantly elevated.  - Will decrease pressures to APAP 10-20 cmH2O  - Offered mask refitting, but he declined at this time.  - Refill of supplies will be sent to the patient's DME.  - Explained the importance of keeping the machine clean, and that they should be eligible for refills of supplies every 3-6 months depending on insurance.  We also discussed the insurance compliance requirements.  - Encouraged healthy lifestyle with adequate sleep (7-9 hours per night), healthy balanced diet and routine exercise.  Explained the importance of avoiding driving while drowsy.  - Follow-up in 6 months.  -     PAP DME Pressure Change    Shift Work Sleep Disorder  - Recommend continuing with melatonin 3 to 5 mg ~4 hours prior to bedtime.  - Discussed the importance of stimulus control and sleep hygiene.  - Reassess once he is better tolerating PAP therapy.    Class 3 Obesity  - Weight loss is recommended.  We discussed the role obesity plays in sleep apnea.  ________________________________________________________________________________________________    Interval History: Alejandro Greene is a 58  y.o. male with PMHx of DIANA on CPAP, HTN, asthma and obesity who returns to the office for follow up.  He reports that he is having difficulty with mask leak and it is causing him to wake up at times.  He states he is having to tighten the straps of his mask to an uncomfortable degree in order to eliminate the leak.  He has tried both FFM and 2 different nasal interfaces without any improvement.  He denies pressure intolerance, aerophagia and rainout currently.  He does report he is getting supply refills and is keeping the device clean on a routine basis.  He does not want to try another mask refitting at this time, but would be amenable to pressure change.  He notes that he is still working shifts and is continuing to have some difficulty with falling and staying asleep.    PAP History  Sleep Apnea Type: DIANA  Most Recent AHI: 77.6 in 2024  Treatment: APAP    DME: [facility]    Uses APAP for 5.5-6 hours per night, 6-7 nights per week.  Current PAP Settings: 12-20 cmH2O  Compliance Data: 80%, see below    Mask Type: nasal  PAP Issues: mask leak, has to tighten straps of mask to an uncomfortable degree.  Uses Chin Strap: No  Uses Ramp Function: Yes   Uses Humidity: Yes     There is a perceived benefit by the patient: does note some benefit when able to tolerate the device.  Observers report no longer has snoring with APAP use.    Prior History: The patient was initially diagnosed with DIANA on PSG in 2000 in [place] and he believes his initial AHI was 50, but states he did not start on PAP therapy until 2010.  He presented to [facility] sleep medicine in 4/2024 at which time his device was 11 years old.  On initial presentation he noted difficulty with frequent awakenings and falling asleep as well as having to pay for supplies out-of-pocket as he was not established with a DME.  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  Plan at his initial visit was for split-night sleep study which showed severe DIANA (AHI 77.2, supine AHI N/A, and REM AHI N/A, O2 melina 80%, TST <90% 29 min) with recommendation for 12-20 cmH2O CPAP.  He was prescribed auto CPAP and is now returning for initial compliance follow-up.    ESS: Total score: 0/24  Greater or equal to 10 is positive for excessive daytime sleepiness    Past Treatments:  CPAP  APAP 12-20 cmH2O    Prior Sleep Studies:  Split Night PSG -- 5/23/2024 (Wt 272.0 lbs)  AHI - 77.2  Sup AHI - NA  REM AHI - NA  O2 Melina - 80.0%  TST < 90% - 29.0 min  PLMI - 0.0  PLM Alejandro - 0.0  Tested Range - 8 to 13 cmH2O  Recommended Pressure - 12 cmH2O (final recommendation was for 12-20 cmH2O)  AHI at Rec Pressure - 1.5    Other Relevant Labs and Studies:  Therapy and Compliance Data -- 8/11/2024 - 9/9/2024      Past Medical History:   Diagnosis Date    Asthma     hospitalization    COVID-19 10/31/2020    Hypertension 2/1/2006    Onychomycosis of toenail     Pneumonia of left lower lobe due to infectious organism 11/30/2018   History reviewed. No pertinent surgical history.  Patient Active Problem List   Diagnosis    Cervical spinal stenosis    Congenital nystagmus    Hypertension    Sleep apnea    Hyperglycemia    Other male erectile dysfunction    COVID-19 virus infection    Mild intermittent asthma    Hip pain, acute, left    Annual physical exam    Essential hypertension    Sleep apnea, unspecified type    Snoring    Frequent nocturnal awakening    Obstructive sleep apnea syndrome     Allergies as of 09/10/2024 - Reviewed 09/10/2024   Allergen Reaction Noted    No active allergies  04/20/2018     REVIEW OF SYSTEMS:  Review of Systems  10-point system review completed, all of which are negative except as mentioned above.    CURRENT MEDICATIONS:  Current Outpatient Medications   Medication Instructions    albuterol (PROVENTIL HFA,VENTOLIN HFA) 90 mcg/act inhaler inhale 2 puff by inhalation  "route  every 4 - 6 hours as needed    cyclobenzaprine (FLEXERIL) 5 mg, Oral, 3 times daily PRN    Jeff 2 % PADS TO BE APPLY TWICE A DAY BETWEEN TOES    fluticasone (FLONASE) 50 mcg/act nasal spray No dose, route, or frequency recorded.    fluticasone-salmeterol (ADVAIR HFA) 230-21 MCG/ACT inhaler 2 puffs, Inhalation, 2 times daily, Rinse mouth after use.    lisinopril (ZESTRIL) 40 mg, Oral, Daily    naproxen (NAPROSYN) 500 mg, Oral, 2 times daily with meals    tadalafil (CIALIS) 20 mg, Oral, Daily PRN    triamcinolone (KENALOG) 0.1 % cream Topical, 2 times daily     SOCIAL HISTORY:  Social History     Tobacco Use    Smoking status: Never    Smokeless tobacco: Never    Tobacco comments:     no exposure to passive smoke   Vaping Use    Vaping status: Never Used   Substance Use Topics    Alcohol use: Yes     Comment: social    Drug use: No     FAMILY HISTORY:  Family History   Problem Relation Age of Onset    Hypertension Family      PHYSICAL EXAMINATION:  Vital Signs:  /90 (BP Location: Left arm, Patient Position: Sitting, Cuff Size: Adult)   Pulse 58   Ht 5' 7\" (1.702 m)   Wt 124 kg (273 lb)   SpO2 94%   BMI 42.76 kg/m²   Body mass index is 42.76 kg/m².  Constitutional: NAD, well appearing, obese   Mental Status: AAOx3  Skin: Warm, dry, no rashes noted   Eyes: PERRL, normal conjunctiva  ENT: Nasal congestion absent, nasal valve incompetence absent.  Posterior Airspace:   Griffiths Tongue Position: 4  Retrognathia: absent  Overbite: absent  High Arched Palate: absent  Tongue Scalloping/Ridging: present  Tonsils: 1+  Uvula: normal  Chest: RRR, +S1/S2, CTA B/L, no W/R/R, no M/R/G   Abdomen: Soft, NT/ND  Extremities: No digital clubbing or pedal edema      Tavia Short MD  Pulmonary-Critical Care and Sleep Medicine  09/16/24    Portions of the record may have been created with voice recognition software. Occasional wrong word or \"sound a like\" substitutions may have occurred due to the inherent limitations of " voice recognition software. Please read the chart carefully and recognize, using context, where substitutions have occurred.

## 2024-09-11 ENCOUNTER — TELEPHONE (OUTPATIENT)
Dept: SLEEP CENTER | Facility: CLINIC | Age: 59
End: 2024-09-11

## 2024-09-12 LAB
DME PARACHUTE DELIVERY DATE ACTUAL: NORMAL
DME PARACHUTE DELIVERY DATE REQUESTED: NORMAL
DME PARACHUTE ITEM DESCRIPTION: NORMAL
DME PARACHUTE ORDER STATUS: NORMAL
DME PARACHUTE SUPPLIER NAME: NORMAL
DME PARACHUTE SUPPLIER PHONE: NORMAL

## 2024-09-20 ENCOUNTER — HOSPITAL ENCOUNTER (OUTPATIENT)
Dept: RADIOLOGY | Facility: HOSPITAL | Age: 59
Discharge: HOME | End: 2024-09-20
Payer: COMMERCIAL

## 2024-09-20 ENCOUNTER — OFFICE VISIT (OUTPATIENT)
Dept: FAMILY MEDICINE CLINIC | Facility: CLINIC | Age: 59
End: 2024-09-20

## 2024-09-20 VITALS
WEIGHT: 267.2 LBS | BODY MASS INDEX: 41.94 KG/M2 | HEART RATE: 62 BPM | OXYGEN SATURATION: 97 % | DIASTOLIC BLOOD PRESSURE: 100 MMHG | HEIGHT: 67 IN | SYSTOLIC BLOOD PRESSURE: 160 MMHG | TEMPERATURE: 97.6 F

## 2024-09-20 DIAGNOSIS — M25.552 HIP PAIN, ACUTE, LEFT: ICD-10-CM

## 2024-09-20 DIAGNOSIS — I10 ESSENTIAL HYPERTENSION: Primary | ICD-10-CM

## 2024-09-20 PROCEDURE — 73502 X-RAY EXAM HIP UNI 2-3 VIEWS: CPT

## 2024-09-20 RX ORDER — PREDNISONE 20 MG/1
40 TABLET ORAL DAILY
Qty: 10 TABLET | Refills: 0 | Status: SHIPPED | OUTPATIENT
Start: 2024-09-20 | End: 2024-09-25

## 2024-09-20 RX ORDER — HYDROCHLOROTHIAZIDE 12.5 MG/1
12.5 TABLET ORAL DAILY
Qty: 30 TABLET | Refills: 5 | Status: SHIPPED | OUTPATIENT
Start: 2024-09-20 | End: 2025-03-19

## 2024-09-20 RX ORDER — IBUPROFEN 800 MG/1
800 TABLET, FILM COATED ORAL EVERY 8 HOURS SCHEDULED
Qty: 30 TABLET | Refills: 0 | Status: SHIPPED | OUTPATIENT
Start: 2024-09-20

## 2024-09-20 RX ORDER — BENZOCAINE/MENTHOL 6 MG-10 MG
LOZENGE MUCOUS MEMBRANE DAILY
Qty: 1 EACH | Refills: 0 | Status: SHIPPED | OUTPATIENT
Start: 2024-09-20

## 2024-09-20 NOTE — PROGRESS NOTES
Adult Annual Physical  Name: Alejandro Greene      : 1965      MRN: [id number]  Encounter Provider: Reji Cruz MD  Encounter Date: 2024   Encounter department: [facility]    Assessment & Plan  Essential hypertension  Current BP: Blood Pressure: 160/100   JN 8 Goal <140/90 <64 yo    Current Medications: lisinopril 40 mg qd   Blood pressure is uncontrolled on present treatment regimen.  Patient misses no doses and tolerates the meds without side effects.  Patient avoids salt.    PLAN:   - Discussed adhering to htn/dash diet including limiting salt to < 2.4 g daily   - Physical activity at least 4/5x weekly of moderate intense activity of 40 minutes   - Advised to limit caffeine and alcohol   - No change in present meds. Continue HBPM, advise to bring logs to next visit  Orders:    Blood Pressure Monitor MISC; Use in the morning    hydroCHLOROthiazide 12.5 mg tablet; Take 1 tablet (12.5 mg total) by mouth daily    Hip pain, acute, left  Likely musclar in nature   R/o osteoarthritis with xray   Ibuprofen 800 mg TID x 5 days   Prednisone x 5 days  Referral to ortho   Orders:    Ambulatory Referral to Orthopedic Surgery; Future    XR hip/pelv 2-3 vws left if performed; Future    predniSONE 20 mg tablet; Take 2 tablets (40 mg total) by mouth daily for 5 days    ibuprofen (MOTRIN) 800 mg tablet; Take 1 tablet (800 mg total) by mouth every 8 (eight) hours    Immunizations and preventive care screenings were discussed with patient today. Appropriate education was printed on patient's after visit summary.    Discussed risks and benefits of prostate cancer screening. We discussed the controversial history of PSA screening for prostate cancer in the [place] as well as the risk of over detection and over treatment of prostate cancer by way of PSA screening.  The patient understands that PSA blood testing is an imperfect way to screen for prostate cancer and that elevated  PSA levels in the blood may also be caused by infection, inflammation, prostatic trauma or manipulation, urological procedures, or by benign prostatic enlargement.    The role of the digital rectal examination in prostate cancer screening was also discussed and I discussed with him that there is large interobserver variability in the findings of digital rectal examination.    Counseling:  Alcohol/drug use: discussed moderation in alcohol intake, the recommendations for healthy alcohol use, and avoidance of illicit drug use.  Dental Health: discussed importance of regular tooth brushing, flossing, and dental visits.  Injury prevention: discussed safety/seat belts, safety helmets, smoke detectors, carbon dioxide detectors, and smoking near bedding or upholstery.  Sexual health: discussed sexually transmitted diseases, partner selection, use of condoms, avoidance of unintended pregnancy, and contraceptive alternatives.  Exercise: the importance of regular exercise/physical activity was discussed. Recommend exercise 3-5 times per week for at least 30 minutes.          History of Present Illness   Hip Pain   The incident occurred 12 to 24 hours ago. There was no injury mechanism. The pain is present in the left hip. The quality of the pain is described as cramping. The pain has been Improving since onset. Associated symptoms include a loss of motion. He reports no foreign bodies present.       Adult Annual Physical:  Patient presents for annual physical.     Diet and Physical Activity:  - Diet/Nutrition: poor diet.  - Exercise: 3-4 times a week on average and 30-60 minutes on average.    Depression Screening:  - PHQ-2 Score: 0    General Health:  - Sleep: witnessed apnea, unrefreshing sleep and snores loudly.  - Hearing: normal hearing bilateral ears.  - Vision: vision problems and most recent eye exam < 1 year ago. 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     Health:  - History of STDs: no.   - Urinary  symptoms: none.     Advanced Care Planning:  - Has an advanced directive?: no    - Has a durable medical POA?: no    - ACP document given to patient?: no      Review of Systems   Constitutional:  Negative for chills and fever.   HENT:  Negative for ear pain and sore throat.    Eyes:  Negative for pain and visual disturbance.   Respiratory:  Negative for cough and shortness of breath.    Cardiovascular:  Negative for chest pain and palpitations.   Gastrointestinal:  Negative for abdominal pain and vomiting.   Genitourinary:  Negative for dysuria and hematuria.   Musculoskeletal:  Negative for arthralgias and back pain.   Skin:  Negative for color change and rash.   Neurological:  Negative for seizures and syncope.   All other systems reviewed and are negative.    Current Outpatient Medications on File Prior to Visit   Medication Sig Dispense Refill    albuterol (PROVENTIL HFA,VENTOLIN HFA) 90 mcg/act inhaler inhale 2 puff by inhalation route  every 4 - 6 hours as needed      fluticasone-salmeterol (ADVAIR HFA) 230-21 MCG/ACT inhaler Inhale 2 puffs 2 (two) times a day Rinse mouth after use. 16 g 5    lisinopril (ZESTRIL) 20 mg tablet Take 2 tablets (40 mg total) by mouth daily 180 tablet 3    [DISCONTINUED] cyclobenzaprine (FLEXERIL) 5 mg tablet Take 1 tablet (5 mg total) by mouth 3 (three) times a day as needed for muscle spasms (Patient not taking: Reported on 9/10/2024) 30 tablet 0    [DISCONTINUED] Jeff 2 % PADS TO BE APPLY TWICE A DAY BETWEEN TOES (Patient not taking: Reported on 10/17/2022)      [DISCONTINUED] fluticasone (FLONASE) 50 mcg/act nasal spray  (Patient not taking: Reported on 10/26/2022)      [DISCONTINUED] naproxen (NAPROSYN) 500 mg tablet Take 1 tablet (500 mg total) by mouth 2 (two) times a day with meals (Patient not taking: Reported on 9/10/2024) 30 tablet 0    [DISCONTINUED] tadalafil (CIALIS) 20 MG tablet Take 1 tablet (20 mg total) by mouth daily as needed for erectile dysfunction (Patient not  "taking: Reported on 9/10/2024) 10 tablet 12    [DISCONTINUED] triamcinolone (KENALOG) 0.1 % cream Apply topically 2 (two) times a day (Patient not taking: Reported on 10/26/2022) 15 g 3     No current facility-administered medications on file prior to visit.      Social History     Tobacco Use    Smoking status: Never     Passive exposure: Never    Smokeless tobacco: Never    Tobacco comments:     no exposure to passive smoke   Vaping Use    Vaping status: Never Used   Substance and Sexual Activity    Alcohol use: Yes     Comment: social    Drug use: No    Sexual activity: Yes     Partners: Female       Objective     /100 (BP Location: Left arm, Patient Position: Sitting, Cuff Size: Large)   Pulse 62   Temp 97.6 °F (36.4 °C) (Temporal)   Ht 5' 7\" (1.702 m)   Wt 121 kg (267 lb 3.2 oz)   SpO2 97%   BMI 41.85 kg/m²     Physical Exam  Vitals and nursing note reviewed.   Constitutional:       General: He is not in acute distress.     Appearance: He is well-developed.   HENT:      Head: Normocephalic and atraumatic.   Eyes:      Conjunctiva/sclera: Conjunctivae normal.   Cardiovascular:      Rate and Rhythm: Normal rate and regular rhythm.      Heart sounds: No murmur heard.  Pulmonary:      Effort: Pulmonary effort is normal. No respiratory distress.      Breath sounds: Normal breath sounds.   Abdominal:      Palpations: Abdomen is soft.      Tenderness: There is no abdominal tenderness.   Musculoskeletal:         General: No swelling.      Cervical back: Neck supple.      Comments: Negative adore and fadir test   Skin:     General: Skin is warm and dry.      Capillary Refill: Capillary refill takes less than 2 seconds.   Neurological:      Mental Status: He is alert.   Psychiatric:         Mood and Affect: Mood normal.         "

## 2024-09-20 NOTE — PATIENT INSTRUCTIONS
"Patient Education     Hip pain in adults   The Basics   Written by the doctors and editors at [facility]   What causes hip pain? -- Many different things can cause hip pain. Sometimes, pain is related to an injury. Other causes include:   Arthritis - This is the general term for \"inflammation or damage of the joints.\" People whose hip pain is caused by arthritis usually develop pain in the groin or thigh, slowly over time.   Bursitis - There are 3 sacs called \"bursae\" in or near the hip (figure 1). These sacs are filled with fluid. They help cushion and protect the joints. \"Bursitis\" is when 1 of these sacs gets irritated or inflamed. People whose hip pain is caused by bursitis usually feel more pain if they lie on their side, or if someone presses against the side of their hip.   Muscle or tendon strain - Three major muscle groups help move the hip. If you overuse these muscles or the tendons that attach them to your bones, it can lead to hip pain. This pain is usually worse when you move your leg in 1 particular direction.   Nerve problems - Lots of nerves pass by the hip. These nerves or nerves in the lower part of the spine can get pressed on or damaged and cause hip pain. People with pain caused by nerve problems also often feel tingling or numbness in the area. A pinched nerve in the spine could cause other problems, such as weakness in the leg.  How is hip pain treated? -- The right treatment depends on what is causing it. In general, treatments might include:   Taking medicines to reduce pain and/or inflammation   Getting a shot of a steroid medicine, which can reduce inflammation   Physical therapy or exercises recommended by the doctor  If you have severe hip arthritis, your doctor or nurse might suggest surgery to replace the hip.  What can I do on my own to feel better? -- You can:   Ask your doctor if there are stretches or exercises that can help with the cause of your pain. Before you do these " "exercises, warm up your muscles by walking or taking a warm shower or bath.   Consider taking non-prescription pain medicines, such as acetaminophen (sample brand name: Tylenol) or naproxen (sample brand name: Aleve). But if you have heart disease or other health problems, or if you are on prescription medicines, ask your doctor before you start taking any new medicines.   Use a cane, walker, shoe insert, or other device, if it helps you.   Apply a cold gel pack, bag of ice, or bag of frozen vegetables on your hip, if it helps with your pain. Do this every 1 to 2 hours, for 15 minutes each time. Put a thin towel between the ice (or other cold object) and your skin.  When should I call the doctor? -- Call for advice if:   Your pain is sudden and severe, prevents you from putting weight on that side, or is so bad that you can't rotate your leg to the side. Some people who have hip pain actually have a broken hip bone. This is especially likely after a fall or even a mild impact. Having a broken hip is serious and needs immediate medical attention.   Your hip is swollen, bruised, or bleeding.   You also have a fever of 100.4°F (38°C) or higher along with your hip pain.   You have weakness in 1 of your legs or feet.   Your toes or foot are numb or turn, blue, gray, or pale.  All topics are updated as new evidence becomes available and our peer review process is complete.  This topic retrieved from [employer] on: Apr 24, 2024.  Topic 70432 Version 15.0  Release: 32.3.2 - C32.113  © 2024 UpToDate, Inc. and/or its affiliates. All rights reserved.  figure 1: Bursae near the hip     These sacs, called \"bursae,\" are filled with fluid. They help cushion and protect the joints. \"Bursitis\" is the medical term for when 1 of these sacs gets irritated or inflamed.  Graphic 57279 Version 8.0  Consumer Information Use and Disclaimer   Disclaimer: This generalized information is a limited summary of diagnosis, treatment, and/or " medication information. It is not meant to be comprehensive and should be used as a tool to help the user understand and/or assess potential diagnostic and treatment options. It does NOT include all information about conditions, treatments, medications, side effects, or risks that may apply to a specific patient. It is not intended to be medical advice or a substitute for the medical advice, diagnosis, or treatment of a health care provider based on the health care provider's examination and assessment of a patient's specific and unique circumstances. Patients must speak with a health care provider for complete information about their health, medical questions, and treatment options, including any risks or benefits regarding use of medications. This information does not endorse any treatments or medications as safe, effective, or approved for treating a specific patient. UpToDate, Inc. and its affiliates disclaim any warranty or liability relating to this information or the use thereof.The use of this information is governed by the Terms of Use, available at https://www.[employer].com/en/know/clinical-effectiveness-terms. 2024© UpToDate, Inc. and its affiliates and/or licensors. All rights reserved.  Copyright   © 2024 UpToDate, Inc. and/or its affiliates. All rights reserved.    Patient Education     Anterior Total Hip Exercises   About this topic   Exercise is an important part of recovering after a total hip replacement. Exercises can help to lessen pain and stiffness. Exercise also helps to move fluid and increase strength. Walking should also be a part of your daily routine.  Please note that these exercises are for an anterior or front approach on a hip replacement. The exercises are different when one has this type of hip replacement instead of a more common posterior or back approach. Make sure you know what surgery you had. There are certain movements that may cause the hip joint to dislocate from the  socket for an ANTERIOR approach. Follow these hip precautions if your doctor tells you to do so:  Avoid moving your operated leg BACKWARDS and out to the SIDE.  Keep your legs from turning in or out.  Do NOT pivot on your operated leg. Twisting your body away from your operated hip puts you in a bad position. Lift your feet and keep them pointed straight ahead when turning.  Avoid taking long steps when walking.  Avoid kneeling on your operated leg. If you have to kneel, then kneel on both knees so the operated hip is not pushed back.  Avoid the straddling position. Be aware of this when lifting your leg to get in and out of a tub. Lift it straight up and go in forward instead of trying to get in and out sideways.  General   Before starting with a program, ask your doctor if you are healthy enough to do these exercises. Your doctor may have you work with a  or physical therapist to make a safe exercise program to meet your needs.  Strengthening Exercises   Strengthening exercises keep your muscles firm and strong. Start by repeating each exercise 2 to 3 times. Work up to doing each exercise 10 times. Try to do the exercises 2 to 3 times each day. Do all exercises slowly.  Ankle pumps ? Move each foot up and down like you are pressing down and lifting up on a gas pedal. This is an important exercise for preventing blood clots. Get in the habit of doing this at least every hour you are awake.  Butt squeezes ? Lie on your back and squeeze your buttocks together. Hold for 3 to 5 seconds.  Quad sets ? Lie on your back with your sore leg straight. Tighten your front thigh muscle on your sore leg. Push the back of your knee into the bed or floor. Hold for 3 to 5 seconds. If you are having trouble getting the knee straight, you may want to lie with a towel roll under the ankle for whatever timeframe you can. From time to time, do quad sets to straighten the knee more.  Heel slides lying down ? Lie on your back with  both legs straight. Start to bend one leg while sliding your heel on the bed. Once you have gotten your leg to bend as much as you can, slide your foot back down to straighten your leg. Repeat with the other leg.  Lower leg lifts with towel ? Lie on your back with your knee slightly bent and foot flat on the bed. Roll up a large towel and put it under your knee. Tighten your front thigh muscles and lift the lower leg off the bed until it as straight as possible. Hold for 3 to 5 seconds.  Straight leg raises lying down ? Lie on your back with one leg straight. Bend your other knee so the foot is flat on the bed. Keeping your leg straight, lift the leg up to the level of your other knee. Lower it back down. Repeat with the other leg. You may need help with this exercise until you are strong enough to do it on your own. If so, have a helper give support under your ankle with one hand.               What will the results be?   Less pain and swelling  Better range of motion  More strength  Faster recovery  Easier to walk and do other activities  Helpful tips   Stay active and work out to keep your muscles strong and flexible.  Keep a healthy weight to avoid putting too much stress on your joints. Eat a healthy diet to keep your muscles healthy.  Be sure you do not hold your breath when exercising. This can raise your blood pressure. If you tend to hold your breath, try counting out loud when exercising. If any exercise bothers you, stop right away.  Try walking and swinging your arms at an easy pace for a few minutes to warm up your muscles. Do this again after exercising.  After exercising, it is a good idea to use ice. Place an ice pack or a bag of frozen peas wrapped in a towel over the painful part. Never put ice right on the skin. Do not leave the ice on more than 10 to 15 minutes at a time. Ice after activity may help decrease pain and swelling. Never ice before stretching.  Exercise may be slightly uncomfortable,  but you should not have sharp pains. If you do get sharp pains, stop what you are doing. If the sharp pains continue, call your doctor.  Last Reviewed Date   2021-05-10  Consumer Information Use and Disclaimer   This generalized information is a limited summary of diagnosis, treatment, and/or medication information. It is not meant to be comprehensive and should be used as a tool to help the user understand and/or assess potential diagnostic and treatment options. It does NOT include all information about conditions, treatments, medications, side effects, or risks that may apply to a specific patient. It is not intended to be medical advice or a substitute for the medical advice, diagnosis, or treatment of a health care provider based on the health care provider's examination and assessment of a patient’s specific and unique circumstances. Patients must speak with a health care provider for complete information about their health, medical questions, and treatment options, including any risks or benefits regarding use of medications. This information does not endorse any treatments or medications as safe, effective, or approved for treating a specific patient. UpToDate, Inc. and its affiliates disclaim any warranty or liability relating to this information or the use thereof. The use of this information is governed by the Terms of Use, available at https://www.[employer].com/en/know/clinical-effectiveness-terms   Copyright   Copyright © 2024 UpToDate, Inc. and its affiliates and/or licensors. All rights reserved.

## 2024-09-20 NOTE — ASSESSMENT & PLAN NOTE
Likely musclar in nature   R/o osteoarthritis with xray   Ibuprofen 800 mg TID x 5 days   Prednisone x 5 days  Referral to ortho   Orders:    Ambulatory Referral to Orthopedic Surgery; Future    XR hip/pelv 2-3 vws left if performed; Future    predniSONE 20 mg tablet; Take 2 tablets (40 mg total) by mouth daily for 5 days    ibuprofen (MOTRIN) 800 mg tablet; Take 1 tablet (800 mg total) by mouth every 8 (eight) hours

## 2024-09-20 NOTE — ASSESSMENT & PLAN NOTE
Current BP: Blood Pressure: 160/100   JNC 8 Goal <140/90 <62 yo    Current Medications: lisinopril 40 mg qd   Blood pressure is uncontrolled on present treatment regimen.  Patient misses no doses and tolerates the meds without side effects.  Patient avoids salt.    PLAN:   - Discussed adhering to htn/dash diet including limiting salt to < 2.4 g daily   - Physical activity at least 4/5x weekly of moderate intense activity of 40 minutes   - Advised to limit caffeine and alcohol   - No change in present meds. Continue HBPM, advise to bring logs to next visit  Orders:    Blood Pressure Monitor MISC; Use in the morning    hydroCHLOROthiazide 12.5 mg tablet; Take 1 tablet (12.5 mg total) by mouth daily

## 2024-09-23 ENCOUNTER — TELEPHONE (OUTPATIENT)
Age: 59
End: 2024-09-23

## 2024-09-23 NOTE — TELEPHONE ENCOUNTER
Hi,     We can let him know that he does have osteoarthritis. He can make the appointment with ortho for a potential hip injection and physical therapy is recommended.     Thank you,   Dr. Cruz

## 2024-09-23 NOTE — TELEPHONE ENCOUNTER
"Spoke to pt and was aware of pcp message.     \"Hi,      We can let him know that he does have osteoarthritis. He can make the appointment with ortho for a potential hip injection and physical therapy is recommended.      Thank you,   Dr. Cruz   "

## 2024-10-01 ENCOUNTER — OFFICE VISIT (OUTPATIENT)
Dept: OBGYN CLINIC | Facility: MEDICAL CENTER | Age: 59
End: 2024-10-01
Payer: COMMERCIAL

## 2024-10-01 VITALS
WEIGHT: 267 LBS | HEIGHT: 67 IN | DIASTOLIC BLOOD PRESSURE: 82 MMHG | BODY MASS INDEX: 41.91 KG/M2 | SYSTOLIC BLOOD PRESSURE: 134 MMHG | HEART RATE: 64 BPM

## 2024-10-01 DIAGNOSIS — M16.0 PRIMARY OSTEOARTHRITIS OF BOTH HIPS: Primary | ICD-10-CM

## 2024-10-01 DIAGNOSIS — M25.552 HIP PAIN, ACUTE, LEFT: ICD-10-CM

## 2024-10-01 PROCEDURE — 99203 OFFICE O/P NEW LOW 30 MIN: CPT | Performed by: EMERGENCY MEDICINE

## 2024-10-01 NOTE — PROGRESS NOTES
Assessment/Plan:    Diagnoses and all orders for this visit:    Primary osteoarthritis of both hips  -     Ambulatory Referral to Physical Therapy; Future    Hip pain, acute, left  -     Ambulatory Referral to Orthopedic Surgery  -     Ambulatory Referral to Physical Therapy; Future    Left hip pain arising from Hip OA, discussed treatment options including meds, weight loss and CSI, and reviewed Xrays.      No follow-ups on file.      Subjective:   Patient ID: Alejandro Greene is a 58 y.o. male.    NP presents for left hip pain for the past yr that has been intermittent and increasing over the past two wks. Pt states pain is mostly anterior and can extend posterior. Pt denies any radiating pain, numbness or tingling. Pt states pain has been decreasing since the initial increase in the hip.  He states pain seems to be worse with sitting, better with standing.  He does performed prolonged standing/walking at work.          Review of Systems    The following portions of the patient's chart were reviewed and updated as appropriate:   Allergy:    Allergies   Allergen Reactions    No Active Allergies        Medications:    Current Outpatient Medications:     albuterol (PROVENTIL HFA,VENTOLIN HFA) 90 mcg/act inhaler, inhale 2 puff by inhalation route  every 4 - 6 hours as needed, Disp: , Rfl:     Blood Pressure Monitor MISC, Use in the morning, Disp: 1 each, Rfl: 0    fluticasone-salmeterol (ADVAIR HFA) 230-21 MCG/ACT inhaler, Inhale 2 puffs 2 (two) times a day Rinse mouth after use., Disp: 16 g, Rfl: 5    hydroCHLOROthiazide 12.5 mg tablet, Take 1 tablet (12.5 mg total) by mouth daily, Disp: 30 tablet, Rfl: 5    ibuprofen (MOTRIN) 800 mg tablet, Take 1 tablet (800 mg total) by mouth every 8 (eight) hours, Disp: 30 tablet, Rfl: 0    lisinopril (ZESTRIL) 20 mg tablet, Take 2 tablets (40 mg total) by mouth daily, Disp: 180 tablet, Rfl: 3    Patient Active Problem List   Diagnosis    Cervical spinal stenosis    Congenital  "nystagmus    Hypertension    Sleep apnea    Hyperglycemia    Other male erectile dysfunction    COVID-19 virus infection    Mild intermittent asthma    Hip pain, acute, left    Annual physical exam    Essential hypertension    Sleep apnea, unspecified type    Snoring    Frequent nocturnal awakening    Obstructive sleep apnea syndrome       Objective:  /82   Pulse 64   Ht 5' 7\" (1.702 m)   Wt 121 kg (267 lb)   BMI 41.82 kg/m²     Left Hip Exam     Range of Motion   Flexion:  normal   External rotation:  abnormal   Internal rotation: abnormal     Muscle Strength   The patient has normal left hip strength.     Comments:  Negative seated straight leg raise  Antalgic gait            Physical Exam      Neurologic Exam    Procedures    I have personally reviewed pertinent films in PACS. and I have personally reviewed the written report of the pertinent studies.     LEFT HIP     INDICATION:   Pain in left hip.      COMPARISON:  None.     VIEWS:  XR HIP/PELV 2-3 VWS LEFT  W PELVIS IF PERFORMED   Images: 3     FINDINGS:     There is no acute fracture or dislocation.     There is moderate joint space narrowing with osteophytosis in the left hip. Mild degenerative change in the right hip as well..     No lytic or blastic osseous lesion.     Soft tissues are unremarkable.     The visualized lumbar spine is unremarkable.     IMPRESSION:        Moderate left hip osteoarthritis. No acute osseous abnormality.        Past Medical History:   Diagnosis Date    Asthma     hospitalization    COVID-19 10/31/2020    Hypertension 2/1/2006    Onychomycosis of toenail     Pneumonia of left lower lobe due to infectious organism 11/30/2018       History reviewed. No pertinent surgical history.    Social History     Socioeconomic History    Marital status: /Civil Union     Spouse name: Not on file    Number of children: Not on file    Years of education: Not on file    Highest education level: Not on file   Occupational History "    Not on file   Tobacco Use    Smoking status: Never     Passive exposure: Never    Smokeless tobacco: Never    Tobacco comments:     no exposure to passive smoke   Vaping Use    Vaping status: Never Used   Substance and Sexual Activity    Alcohol use: Yes     Comment: social    Drug use: No    Sexual activity: Yes     Partners: Female   Other Topics Concern    Not on file   Social History Narrative    Not on file     Social Determinants of Health     Financial Resource Strain: Not on file   Food Insecurity: Not on file   Transportation Needs: Not on file   Physical Activity: Not on file   Stress: Not on file   Social Connections: Not on file   Intimate Partner Violence: Not on file   Housing Stability: Not on file       Family History   Problem Relation Age of Onset    Hypertension Family

## 2024-10-18 ENCOUNTER — TELEPHONE (OUTPATIENT)
Dept: OBGYN CLINIC | Facility: HOSPITAL | Age: 59
End: 2024-10-18

## 2024-10-18 NOTE — TELEPHONE ENCOUNTER
Caller: Patient    Doctor: Faith Santizo    Reason for call: Patient is calling in regards to [facility] paperwork. I did inform the patient of the 10-14 business day turnaround time. Patient states he handed in the [facility] paperwork on 10/8, informed patient of 10/28 at latest (14 bus. days). Patient states they would like the paperwork sent over by 10/21 if possible. Please advise.    Call back#: [phone]

## 2024-10-21 NOTE — TELEPHONE ENCOUNTER
LM for pt to return call in regards to forms   Dr Cuevas does not have pt out of work or working with restrictions so what is pt looking for on forms? If pt is requesting intermittent time off I will have to reach out to the dr to see if he approves.

## 2024-10-24 NOTE — TELEPHONE ENCOUNTER
Caller: Alejandro     Doctor: Faith Santizo     Reason for call: Patient is applying for Intermittent FMLA due to his Osteoarthritis in hip.     Patient has good days and some bad days and would like to know he could have a day if pain is really bad.  He is on his feet 10 hr days .   His paperwork goes through [employer] .     Call back#: [phone]

## 2024-10-31 ENCOUNTER — OFFICE VISIT (OUTPATIENT)
Dept: FAMILY MEDICINE CLINIC | Facility: CLINIC | Age: 59
End: 2024-10-31
Payer: COMMERCIAL

## 2024-10-31 VITALS
BODY MASS INDEX: 42.38 KG/M2 | DIASTOLIC BLOOD PRESSURE: 82 MMHG | TEMPERATURE: 98 F | HEART RATE: 67 BPM | SYSTOLIC BLOOD PRESSURE: 140 MMHG | WEIGHT: 270 LBS | HEIGHT: 67 IN | OXYGEN SATURATION: 97 %

## 2024-10-31 DIAGNOSIS — I10 ESSENTIAL HYPERTENSION: Primary | ICD-10-CM

## 2024-10-31 DIAGNOSIS — M25.552 HIP PAIN, ACUTE, LEFT: ICD-10-CM

## 2024-10-31 PROCEDURE — 99213 OFFICE O/P EST LOW 20 MIN: CPT

## 2024-10-31 NOTE — ASSESSMENT & PLAN NOTE
Current BP: Blood Pressure: 140/82   JNC 8 Goal <140/90 <62 yo    Current Medications: lisinopril 40 mg qd and hctz 12.5 mg qd   Blood pressure is uncontrolled on present treatment regimen.  Patient misses no doses and tolerates the meds without side effects.  Patient avoids salt.     PLAN:   - Discussed adhering to htn/dash diet including limiting salt to < 2.4 g daily   - Physical activity at least 4/5x weekly of moderate intense activity of 40 minutes   - Advised to limit caffeine and alcohol   - No change in present meds. Continue HBPM, advise to bring logs to next visit

## 2024-10-31 NOTE — PROGRESS NOTES
Ambulatory Visit  Name: Alejandro Greene      : 1965      MRN: [id number]  Encounter Provider: Reji Cruz MD  Encounter Date: 10/31/2024   Encounter department: [facility]    Assessment & Plan  Essential hypertension  Current BP: Blood Pressure: 140/82   JNC 8 Goal <140/90 <62 yo    Current Medications: lisinopril 40 mg qd and hctz 12.5 mg qd   Blood pressure is uncontrolled on present treatment regimen.  Patient misses no doses and tolerates the meds without side effects.  Patient avoids salt.     PLAN:   - Discussed adhering to htn/dash diet including limiting salt to < 2.4 g daily   - Physical activity at least 4/5x weekly of moderate intense activity of 40 minutes   - Advised to limit caffeine and alcohol   - No change in present meds. Continue HBPM, advise to bring logs to next visit       Hip pain, acute, left  Seeing ortho   PT             History of Present Illness   Alejandro Greene is a 59 y.o. male with pmhx of htn presenting today     Patient reports that he is doing well/ have been compliant with medications.         Review of Systems   Constitutional:  Negative for chills and fever.   HENT:  Negative for ear pain and sore throat.    Eyes:  Negative for pain and visual disturbance.   Respiratory:  Negative for cough and shortness of breath.    Cardiovascular:  Negative for chest pain and palpitations.   Gastrointestinal:  Negative for abdominal pain and vomiting.   Genitourinary:  Negative for dysuria and hematuria.   Musculoskeletal:  Negative for arthralgias and back pain.   Skin:  Negative for color change and rash.   Neurological:  Negative for seizures and syncope.   All other systems reviewed and are negative.    Past Medical History:   Diagnosis Date    Asthma     hospitalization    COVID-19 10/31/2020    Hypertension 2006    Onychomycosis of toenail     Pneumonia of left lower lobe due to infectious organism 2018     History reviewed. No  "pertinent surgical history.  Family History   Problem Relation Age of Onset    Hypertension Family      Social History     Tobacco Use    Smoking status: Never     Passive exposure: Never    Smokeless tobacco: Never    Tobacco comments:     no exposure to passive smoke   Vaping Use    Vaping status: Never Used   Substance and Sexual Activity    Alcohol use: Yes     Comment: social    Drug use: No    Sexual activity: Yes     Partners: Female     Current Outpatient Medications on File Prior to Visit   Medication Sig    albuterol (PROVENTIL HFA,VENTOLIN HFA) 90 mcg/act inhaler inhale 2 puff by inhalation route  every 4 - 6 hours as needed    Blood Pressure Monitor MISC Use in the morning    fluticasone-salmeterol (ADVAIR HFA) 230-21 MCG/ACT inhaler Inhale 2 puffs 2 (two) times a day Rinse mouth after use.    hydroCHLOROthiazide 12.5 mg tablet Take 1 tablet (12.5 mg total) by mouth daily    ibuprofen (MOTRIN) 800 mg tablet Take 1 tablet (800 mg total) by mouth every 8 (eight) hours    lisinopril (ZESTRIL) 20 mg tablet Take 2 tablets (40 mg total) by mouth daily     Allergies   Allergen Reactions    No Active Allergies      There is no immunization history for the selected administration types on file for this patient.    Objective   /82 (BP Location: Left arm, Patient Position: Sitting, Cuff Size: Standard)   Pulse 67   Temp 98 °F (36.7 °C) (Temporal)   Ht 5' 7\" (1.702 m)   Wt 122 kg (270 lb)   SpO2 97%   BMI 42.29 kg/m²     Physical Exam  Vitals and nursing note reviewed.   Constitutional:       General: He is not in acute distress.     Appearance: He is well-developed.   HENT:      Head: Normocephalic and atraumatic.   Eyes:      Conjunctiva/sclera: Conjunctivae normal.   Cardiovascular:      Rate and Rhythm: Normal rate and regular rhythm.      Heart sounds: No murmur heard.  Pulmonary:      Effort: Pulmonary effort is normal. No respiratory distress.      Breath sounds: Normal breath sounds.   Abdominal: "      Palpations: Abdomen is soft.      Tenderness: There is no abdominal tenderness.   Musculoskeletal:         General: No swelling.      Cervical back: Neck supple.   Skin:     General: Skin is warm and dry.      Capillary Refill: Capillary refill takes less than 2 seconds.   Neurological:      Mental Status: He is alert.   Psychiatric:         Mood and Affect: Mood normal.

## 2024-12-05 ENCOUNTER — OFFICE VISIT (OUTPATIENT)
Dept: OBGYN CLINIC | Facility: MEDICAL CENTER | Age: 59
End: 2024-12-05
Payer: COMMERCIAL

## 2024-12-05 VITALS
DIASTOLIC BLOOD PRESSURE: 74 MMHG | BODY MASS INDEX: 42.38 KG/M2 | HEIGHT: 67 IN | HEART RATE: 64 BPM | WEIGHT: 270 LBS | SYSTOLIC BLOOD PRESSURE: 128 MMHG

## 2024-12-05 DIAGNOSIS — M25.552 HIP PAIN, ACUTE, LEFT: Primary | ICD-10-CM

## 2024-12-05 DIAGNOSIS — M16.0 PRIMARY OSTEOARTHRITIS OF BOTH HIPS: ICD-10-CM

## 2024-12-05 PROCEDURE — 99213 OFFICE O/P EST LOW 20 MIN: CPT | Performed by: EMERGENCY MEDICINE

## 2024-12-05 NOTE — PROGRESS NOTES
Assessment/Plan:    Diagnoses and all orders for this visit:    Hip pain, acute, left  -     FL spine and pain procedure; Future    Primary osteoarthritis of both hips  -     FL spine and pain procedure; Future    Discussed OTC NSAIDs  FL guided CSI left hip  Reviewed Xrays hip    No follow-ups on file.      Subjective:   Patient ID: Alejandro Greene is a 59 y.o. male.    Patient returns noting continued pain left anterior and lateral hip pain, does find benefit when taking IBU before work.  Notes his pain is minimal on his days off.      Initial note:  NP presents for left hip pain for the past yr that has been intermittent and increasing over the past two wks. Pt states pain is mostly anterior and can extend posterior. Pt denies any radiating pain, numbness or tingling. Pt states pain has been decreasing since the initial increase in the hip.  He states pain seems to be worse with sitting, better with standing.  He does performed prolonged standing/walking at work.          Review of Systems    The following portions of the patient's chart were reviewed and updated as appropriate:   Allergy:    Allergies   Allergen Reactions    No Active Allergies        Medications:    Current Outpatient Medications:     albuterol (PROVENTIL HFA,VENTOLIN HFA) 90 mcg/act inhaler, inhale 2 puff by inhalation route  every 4 - 6 hours as needed, Disp: , Rfl:     Blood Pressure Monitor MISC, Use in the morning, Disp: 1 each, Rfl: 0    fluticasone-salmeterol (ADVAIR HFA) 230-21 MCG/ACT inhaler, Inhale 2 puffs 2 (two) times a day Rinse mouth after use., Disp: 16 g, Rfl: 5    hydroCHLOROthiazide 12.5 mg tablet, Take 1 tablet (12.5 mg total) by mouth daily, Disp: 30 tablet, Rfl: 5    ibuprofen (MOTRIN) 800 mg tablet, Take 1 tablet (800 mg total) by mouth every 8 (eight) hours, Disp: 30 tablet, Rfl: 0    lisinopril (ZESTRIL) 20 mg tablet, Take 2 tablets (40 mg total) by mouth daily, Disp: 180 tablet, Rfl: 3    Patient Active Problem List  "  Diagnosis    Cervical spinal stenosis    Congenital nystagmus    Sleep apnea    Hyperglycemia    Other male erectile dysfunction    COVID-19 virus infection    Mild intermittent asthma    Hip pain, acute, left    Annual physical exam    Essential hypertension    Sleep apnea, unspecified type    Snoring    Frequent nocturnal awakening    Obstructive sleep apnea syndrome       Objective:  /74   Pulse 64   Ht 5' 7\" (1.702 m)   Wt 122 kg (270 lb)   BMI 42.29 kg/m²     Left Hip Exam     Range of Motion   External rotation:  abnormal   Internal rotation: abnormal     Comments:  Pain with passive and active ROM            Physical Exam      Neurologic Exam    Procedures    I have personally reviewed the written report of the pertinent studies.             Past Medical History:   Diagnosis Date    Asthma     hospitalization    COVID-19 10/31/2020    Hypertension 2/1/2006    Onychomycosis of toenail     Pneumonia of left lower lobe due to infectious organism 11/30/2018       History reviewed. No pertinent surgical history.    Social History     Socioeconomic History    Marital status: /Civil Union     Spouse name: Not on file    Number of children: Not on file    Years of education: Not on file    Highest education level: Not on file   Occupational History    Not on file   Tobacco Use    Smoking status: Never     Passive exposure: Never    Smokeless tobacco: Never    Tobacco comments:     no exposure to passive smoke   Vaping Use    Vaping status: Never Used   Substance and Sexual Activity    Alcohol use: Yes     Comment: social    Drug use: No    Sexual activity: Yes     Partners: Female   Other Topics Concern    Not on file   Social History Narrative    Not on file     Social Drivers of Health     Financial Resource Strain: Not on file   Food Insecurity: Not on file   Transportation Needs: Not on file   Physical Activity: Not on file   Stress: Not on file   Social Connections: Not on file   Intimate " Partner Violence: Not on file   Housing Stability: Not on file       Family History   Problem Relation Age of Onset    Hypertension Family

## 2025-01-24 ENCOUNTER — TELEPHONE (OUTPATIENT)
Dept: RADIOLOGY | Facility: MEDICAL CENTER | Age: 60
End: 2025-01-24

## 2025-01-24 NOTE — TELEPHONE ENCOUNTER
Patient did not arrive for his 2:20pm Procedure on 1/24/25. Called and left message for patient to call and reschedule.

## 2025-02-06 ENCOUNTER — OFFICE VISIT (OUTPATIENT)
Dept: FAMILY MEDICINE CLINIC | Facility: CLINIC | Age: 60
End: 2025-02-06
Payer: COMMERCIAL

## 2025-02-06 VITALS
OXYGEN SATURATION: 98 % | HEART RATE: 59 BPM | DIASTOLIC BLOOD PRESSURE: 82 MMHG | SYSTOLIC BLOOD PRESSURE: 138 MMHG | HEIGHT: 67 IN | TEMPERATURE: 97.8 F | WEIGHT: 269.6 LBS | BODY MASS INDEX: 42.31 KG/M2

## 2025-02-06 DIAGNOSIS — I10 ESSENTIAL HYPERTENSION: Primary | ICD-10-CM

## 2025-02-06 DIAGNOSIS — Z12.11 COLON CANCER SCREENING: ICD-10-CM

## 2025-02-06 DIAGNOSIS — R73.03 PREDIABETES: ICD-10-CM

## 2025-02-06 PROCEDURE — 99214 OFFICE O/P EST MOD 30 MIN: CPT

## 2025-02-06 NOTE — ASSESSMENT & PLAN NOTE
Orders:  •  Lipid panel; Future  •  Albumin / creatinine urine ratio; Future  •  Basic metabolic panel; Future

## 2025-02-06 NOTE — PROGRESS NOTES
Name: Alejandro Greene      : 1965      MRN: [id number]  Encounter Provider: Reji Cruz MD  Encounter Date: 2025   Encounter department: [facility]    Assessment & Plan  Essential hypertension    Orders:  •  Lipid panel; Future  •  Albumin / creatinine urine ratio; Future  •  Basic metabolic panel; Future    Prediabetes  Lab Results   Component Value Date    HGBA1C 6.3 (H) 2024   Diet controlled  Discussed increasing physical activity   Being cautious with sugary drinks and high carb food   Orders:  •  Hemoglobin A1C; Future    Colon cancer screening    Orders:  •  Ambulatory referral to Gastroenterology; Future         History of Present Illness     Alejandro Greene is a 59 y.o. male with pmhx of htn presenting today review of chronic conditions.         Review of Systems   Constitutional:  Negative for chills and fever.   HENT:  Negative for ear pain and sore throat.    Eyes:  Negative for pain and visual disturbance.   Respiratory:  Negative for cough and shortness of breath.    Cardiovascular:  Negative for chest pain and palpitations.   Gastrointestinal:  Negative for abdominal pain and vomiting.   Genitourinary:  Negative for dysuria and hematuria.   Musculoskeletal:  Negative for arthralgias and back pain.   Skin:  Negative for color change and rash.   Neurological:  Negative for seizures and syncope.   All other systems reviewed and are negative.    Past Medical History:   Diagnosis Date   • Asthma     hospitalization   • COVID-19 10/31/2020   • Hypertension 2006   • Onychomycosis of toenail    • Pneumonia of left lower lobe due to infectious organism 2018     History reviewed. No pertinent surgical history.  Family History   Problem Relation Age of Onset   • No Known Problems Mother    • No Known Problems Father    • Hypertension Family      Social History     Tobacco Use   • Smoking status: Never     Passive exposure: Never   • Smokeless  "tobacco: Never   • Tobacco comments:     no exposure to passive smoke   Vaping Use   • Vaping status: Never Used   Substance and Sexual Activity   • Alcohol use: Yes     Comment: social   • Drug use: No   • Sexual activity: Yes     Partners: Female     Current Outpatient Medications on File Prior to Visit   Medication Sig   • albuterol (PROVENTIL HFA,VENTOLIN HFA) 90 mcg/act inhaler inhale 2 puff by inhalation route  every 4 - 6 hours as needed   • Blood Pressure Monitor MISC Use in the morning   • fluticasone-salmeterol (ADVAIR HFA) 230-21 MCG/ACT inhaler Inhale 2 puffs 2 (two) times a day Rinse mouth after use.   • hydroCHLOROthiazide 12.5 mg tablet Take 1 tablet (12.5 mg total) by mouth daily   • ibuprofen (MOTRIN) 800 mg tablet Take 1 tablet (800 mg total) by mouth every 8 (eight) hours   • lisinopril (ZESTRIL) 20 mg tablet Take 2 tablets (40 mg total) by mouth daily     Allergies   Allergen Reactions   • No Active Allergies      There is no immunization history for the selected administration types on file for this patient.  Objective   /82 (BP Location: Left arm, Patient Position: Sitting, Cuff Size: Adult)   Pulse 59   Temp 97.8 °F (36.6 °C) (Temporal)   Ht 5' 7\" (1.702 m)   Wt 122 kg (269 lb 9.6 oz)   SpO2 98%   BMI 42.23 kg/m²     Physical Exam  Vitals and nursing note reviewed.   Constitutional:       General: He is not in acute distress.     Appearance: He is well-developed.   HENT:      Head: Normocephalic and atraumatic.   Eyes:      Conjunctiva/sclera: Conjunctivae normal.   Cardiovascular:      Rate and Rhythm: Normal rate and regular rhythm.      Heart sounds: No murmur heard.  Pulmonary:      Effort: Pulmonary effort is normal. No respiratory distress.      Breath sounds: Normal breath sounds.   Abdominal:      Palpations: Abdomen is soft.      Tenderness: There is no abdominal tenderness.   Musculoskeletal:         General: No swelling.      Cervical back: Neck supple.   Skin:     " General: Skin is warm and dry.      Capillary Refill: Capillary refill takes less than 2 seconds.   Neurological:      Mental Status: He is alert.   Psychiatric:         Mood and Affect: Mood normal.

## 2025-02-19 ENCOUNTER — TELEPHONE (OUTPATIENT)
Age: 60
End: 2025-02-19

## 2025-02-20 ENCOUNTER — TELEPHONE (OUTPATIENT)
Age: 60
End: 2025-02-20

## 2025-02-21 ENCOUNTER — HOSPITAL ENCOUNTER (OUTPATIENT)
Dept: RADIOLOGY | Facility: MEDICAL CENTER | Age: 60
End: 2025-02-21
Payer: COMMERCIAL

## 2025-02-21 VITALS
HEART RATE: 68 BPM | OXYGEN SATURATION: 95 % | DIASTOLIC BLOOD PRESSURE: 69 MMHG | SYSTOLIC BLOOD PRESSURE: 143 MMHG | RESPIRATION RATE: 18 BRPM | TEMPERATURE: 97.7 F

## 2025-02-21 DIAGNOSIS — M25.552 HIP PAIN, ACUTE, LEFT: ICD-10-CM

## 2025-02-21 DIAGNOSIS — M16.0 PRIMARY OSTEOARTHRITIS OF BOTH HIPS: ICD-10-CM

## 2025-02-21 PROCEDURE — 77002 NEEDLE LOCALIZATION BY XRAY: CPT

## 2025-02-21 PROCEDURE — 77002 NEEDLE LOCALIZATION BY XRAY: CPT | Performed by: PHYSICAL MEDICINE & REHABILITATION

## 2025-02-21 PROCEDURE — 20610 DRAIN/INJ JOINT/BURSA W/O US: CPT | Performed by: PHYSICAL MEDICINE & REHABILITATION

## 2025-02-21 RX ORDER — METHYLPREDNISOLONE ACETATE 40 MG/ML
40 INJECTION, SUSPENSION INTRA-ARTICULAR; INTRALESIONAL; INTRAMUSCULAR; PARENTERAL; SOFT TISSUE ONCE
Status: COMPLETED | OUTPATIENT
Start: 2025-02-21 | End: 2025-02-21

## 2025-02-21 RX ORDER — ROPIVACAINE HYDROCHLORIDE 2 MG/ML
3 INJECTION, SOLUTION EPIDURAL; INFILTRATION; PERINEURAL ONCE
Status: COMPLETED | OUTPATIENT
Start: 2025-02-21 | End: 2025-02-21

## 2025-02-21 RX ADMIN — METHYLPREDNISOLONE ACETATE 40 MG: 40 INJECTION, SUSPENSION INTRA-ARTICULAR; INTRALESIONAL; INTRAMUSCULAR; SOFT TISSUE at 14:06

## 2025-02-21 RX ADMIN — ROPIVACAINE HYDROCHLORIDE 3 ML: 2 INJECTION, SOLUTION EPIDURAL; INFILTRATION at 14:06

## 2025-02-21 RX ADMIN — IOHEXOL 2 ML: 300 INJECTION, SOLUTION INTRAVENOUS at 14:06

## 2025-02-21 NOTE — DISCHARGE INSTR - LAB

## 2025-02-21 NOTE — H&P
History of Present Illness: The patient is a 59 y.o. male who presents with complaints of left hip pain    Past Medical History:   Diagnosis Date    Asthma     hospitalization    COVID-19 10/31/2020    Hypertension 2/1/2006    Onychomycosis of toenail     Pneumonia of left lower lobe due to infectious organism 11/30/2018       No past surgical history on file.      Current Outpatient Medications:     albuterol (PROVENTIL HFA,VENTOLIN HFA) 90 mcg/act inhaler, inhale 2 puff by inhalation route  every 4 - 6 hours as needed, Disp: , Rfl:     Blood Pressure Monitor MISC, Use in the morning, Disp: 1 each, Rfl: 0    fluticasone-salmeterol (ADVAIR HFA) 230-21 MCG/ACT inhaler, Inhale 2 puffs 2 (two) times a day Rinse mouth after use., Disp: 16 g, Rfl: 5    hydroCHLOROthiazide 12.5 mg tablet, Take 1 tablet (12.5 mg total) by mouth daily, Disp: 30 tablet, Rfl: 5    ibuprofen (MOTRIN) 800 mg tablet, Take 1 tablet (800 mg total) by mouth every 8 (eight) hours, Disp: 30 tablet, Rfl: 0    lisinopril (ZESTRIL) 20 mg tablet, Take 2 tablets (40 mg total) by mouth daily, Disp: 180 tablet, Rfl: 3    Allergies   Allergen Reactions    No Active Allergies        Physical Exam:   Vitals:    02/21/25 1352   BP: 148/77   Pulse: 94   Resp: 18   Temp: 97.7 °F (36.5 °C)   SpO2: 95%     General: Awake, Alert, Oriented x 3, Mood and affect appropriate  Respiratory: Respirations even and unlabored  Cardiovascular: Peripheral pulses intact; no edema  Musculoskeletal Exam: left hip pain    ASA Score: 3    Patient/Chart Verification  Patient ID Verified: Verbal  ID Band Applied: No  Consents Confirmed: To be obtained in the Procedural area  Interval H&P(within 24 hr) Complete (required for Outpatients and Surgery Admit only): To be obtained in the Procedural area  Allergies Reviewed: Yes  Anticoag/NSAID held?: NA  Currently on antibiotics?: No    Assessment:   1. Hip pain, acute, left    2. Primary osteoarthritis of both hips        Plan: Left hip  joint injection

## 2025-03-07 ENCOUNTER — TELEPHONE (OUTPATIENT)
Dept: PAIN MEDICINE | Facility: CLINIC | Age: 60
End: 2025-03-07

## 2025-03-25 ENCOUNTER — OFFICE VISIT (OUTPATIENT)
Dept: FAMILY MEDICINE CLINIC | Facility: CLINIC | Age: 60
End: 2025-03-25
Payer: COMMERCIAL

## 2025-03-25 VITALS
WEIGHT: 266 LBS | HEART RATE: 60 BPM | OXYGEN SATURATION: 97 % | DIASTOLIC BLOOD PRESSURE: 98 MMHG | TEMPERATURE: 97.6 F | SYSTOLIC BLOOD PRESSURE: 150 MMHG | BODY MASS INDEX: 41.75 KG/M2 | HEIGHT: 67 IN

## 2025-03-25 DIAGNOSIS — J45.20 MILD INTERMITTENT ASTHMA WITHOUT COMPLICATION: ICD-10-CM

## 2025-03-25 DIAGNOSIS — R05.9 COUGH, UNSPECIFIED TYPE: Primary | ICD-10-CM

## 2025-03-25 PROCEDURE — 87636 SARSCOV2 & INF A&B AMP PRB: CPT

## 2025-03-25 PROCEDURE — 99213 OFFICE O/P EST LOW 20 MIN: CPT

## 2025-03-25 RX ORDER — FLUTICASONE PROPIONATE 50 MCG
1 SPRAY, SUSPENSION (ML) NASAL DAILY
Qty: 15.8 ML | Refills: 0 | Status: SHIPPED | OUTPATIENT
Start: 2025-03-25

## 2025-03-25 RX ORDER — BENZONATATE 100 MG/1
100 CAPSULE ORAL 3 TIMES DAILY PRN
Qty: 20 CAPSULE | Refills: 0 | Status: CANCELLED | OUTPATIENT
Start: 2025-03-25

## 2025-03-25 NOTE — PROGRESS NOTES
"Name: Alejandro Greene      : 1965      MRN: [id number]  Encounter Provider: Reji Cruz MD  Encounter Date: 3/25/2025   Encounter department: [facility]  :  Assessment & Plan  Cough, unspecified type  URI vs allergies vs asthma     Will do trial of Flonase   Orders:  •  fluticasone (FLONASE) 50 mcg/act nasal spray; 1 spray into each nostril daily  •  Covid/Flu- Office Collect Normal    Mild intermittent asthma without complication  Stable   Albuterol pRN               History of Present Illness   Alejandro Greene is a 59 y.o. male with pmhx of htn presenting today congestion       Cough  This is a new problem. The current episode started 1 to 4 weeks ago (13 days ago). The problem has been waxing and waning. The problem occurs every few minutes. The cough is Productive of sputum. Associated symptoms include ear congestion, nasal congestion, a sore throat and shortness of breath. Pertinent negatives include no chest pain, chills, ear pain, fever, rash or rhinorrhea. Nothing aggravates the symptoms. He has tried OTC cough suppressant for the symptoms. The treatment provided mild relief.     Review of Systems   Constitutional:  Negative for chills and fever.   HENT:  Positive for sore throat. Negative for ear pain and rhinorrhea.    Eyes:  Negative for pain and visual disturbance.   Respiratory:  Positive for cough and shortness of breath.    Cardiovascular:  Negative for chest pain and palpitations.   Gastrointestinal:  Negative for abdominal pain and vomiting.   Genitourinary:  Negative for dysuria and hematuria.   Musculoskeletal:  Negative for arthralgias and back pain.   Skin:  Negative for color change and rash.   Neurological:  Negative for seizures and syncope.   All other systems reviewed and are negative.      Objective   /98 (BP Location: Left arm, Patient Position: Sitting, Cuff Size: Large)   Pulse 60   Temp 97.6 °F (36.4 °C) (Temporal)   Ht 5' 7\" (1.702 " m)   Wt 121 kg (266 lb)   SpO2 97%   BMI 41.66 kg/m²      Physical Exam  Vitals and nursing note reviewed.   Constitutional:       General: He is not in acute distress.     Appearance: He is well-developed.   HENT:      Head: Normocephalic and atraumatic.      Mouth/Throat:      Pharynx: Oropharynx is clear. No pharyngeal swelling or posterior oropharyngeal erythema.      Tonsils: No tonsillar exudate or tonsillar abscesses.   Eyes:      Conjunctiva/sclera: Conjunctivae normal.   Cardiovascular:      Rate and Rhythm: Normal rate and regular rhythm.      Heart sounds: No murmur heard.  Pulmonary:      Effort: Pulmonary effort is normal. No respiratory distress.      Breath sounds: Normal breath sounds. No stridor, decreased air movement or transmitted upper airway sounds. No decreased breath sounds, wheezing, rhonchi or rales.   Abdominal:      Palpations: Abdomen is soft.      Tenderness: There is no abdominal tenderness.   Musculoskeletal:         General: No swelling.      Cervical back: Neck supple.   Skin:     General: Skin is warm and dry.      Capillary Refill: Capillary refill takes less than 2 seconds.   Neurological:      Mental Status: He is alert.   Psychiatric:         Mood and Affect: Mood normal.

## 2025-03-26 ENCOUNTER — TELEPHONE (OUTPATIENT)
Age: 60
End: 2025-03-26

## 2025-03-26 NOTE — TELEPHONE ENCOUNTER
[facility] calling to verify that speciman will be sent today because it is not on packing list. Reached out to [facility] for clinical support. She confirmed delivery later today.

## 2025-03-27 ENCOUNTER — RESULTS FOLLOW-UP (OUTPATIENT)
Dept: FAMILY MEDICINE CLINIC | Facility: CLINIC | Age: 60
End: 2025-03-27

## 2025-03-27 LAB
FLUAV RNA RESP QL NAA+PROBE: NEGATIVE
FLUBV RNA RESP QL NAA+PROBE: NEGATIVE
SARS-COV-2 RNA RESP QL NAA+PROBE: NEGATIVE

## 2025-05-20 DIAGNOSIS — I10 ESSENTIAL HYPERTENSION: ICD-10-CM

## 2025-05-20 RX ORDER — LISINOPRIL 20 MG/1
40 TABLET ORAL DAILY
Qty: 60 TABLET | Refills: 0 | Status: SHIPPED | OUTPATIENT
Start: 2025-05-20

## 2025-05-20 NOTE — TELEPHONE ENCOUNTER
Reason for call:   [x] Refill   [] Prior Auth  [] Other:     Office:   [x] PCP/Provider - Carisa Reed,    [] Specialty/Provider -     Medication:     lisinopril (ZESTRIL) 20 mg tablet       Dose/Frequency: Take 2 tablets (40 mg total) by mouth daily     Quantity: 180    Pharmacy: [address] [place] [address] [phone]     Local Pharmacy   Does the patient have enough for 3 days?   [] Yes   [x] No - Send as HP to POD    Mail Away Pharmacy   Does the patient have enough for 10 days?   [] Yes   [] No - Send as HP to POD

## 2025-06-16 DIAGNOSIS — I10 ESSENTIAL HYPERTENSION: ICD-10-CM

## 2025-06-17 RX ORDER — LISINOPRIL 20 MG/1
40 TABLET ORAL DAILY
Qty: 60 TABLET | Refills: 0 | Status: SHIPPED | OUTPATIENT
Start: 2025-06-17

## 2025-08-07 ENCOUNTER — APPOINTMENT (OUTPATIENT)
Dept: LAB | Facility: HOSPITAL | Age: 60
End: 2025-08-07
Payer: COMMERCIAL

## 2025-08-07 ENCOUNTER — OFFICE VISIT (OUTPATIENT)
Dept: FAMILY MEDICINE CLINIC | Facility: CLINIC | Age: 60
End: 2025-08-07
Payer: COMMERCIAL

## 2025-08-07 VITALS
HEART RATE: 58 BPM | TEMPERATURE: 98 F | WEIGHT: 263.4 LBS | BODY MASS INDEX: 41.34 KG/M2 | OXYGEN SATURATION: 97 % | SYSTOLIC BLOOD PRESSURE: 152 MMHG | DIASTOLIC BLOOD PRESSURE: 100 MMHG | HEIGHT: 67 IN

## 2025-08-07 DIAGNOSIS — I10 ESSENTIAL HYPERTENSION: Primary | ICD-10-CM

## 2025-08-07 DIAGNOSIS — M16.0 BILATERAL PRIMARY OSTEOARTHRITIS OF HIP: ICD-10-CM

## 2025-08-07 PROBLEM — G47.30 SLEEP APNEA, UNSPECIFIED TYPE: Status: RESOLVED | Noted: 2024-03-19 | Resolved: 2025-08-07

## 2025-08-07 PROCEDURE — 99214 OFFICE O/P EST MOD 30 MIN: CPT

## 2025-08-07 RX ORDER — HYDROCHLOROTHIAZIDE 12.5 MG/1
12.5 TABLET ORAL DAILY
Qty: 90 TABLET | Refills: 1 | Status: SHIPPED | OUTPATIENT
Start: 2025-08-07 | End: 2026-02-03

## 2025-08-07 RX ORDER — MELOXICAM 7.5 MG/1
7.5 TABLET ORAL DAILY
Qty: 90 TABLET | Refills: 0 | Status: SHIPPED | OUTPATIENT
Start: 2025-08-07

## 2025-08-07 RX ORDER — LISINOPRIL 40 MG/1
40 TABLET ORAL DAILY
Qty: 90 TABLET | Refills: 1 | Status: SHIPPED | OUTPATIENT
Start: 2025-08-07

## 2025-08-07 RX ORDER — HYDROCHLOROTHIAZIDE 25 MG/1
25 TABLET ORAL DAILY
Qty: 90 TABLET | Refills: 1 | Status: SHIPPED | OUTPATIENT
Start: 2025-08-07 | End: 2025-08-07